# Patient Record
Sex: FEMALE | Race: BLACK OR AFRICAN AMERICAN | Employment: OTHER | URBAN - METROPOLITAN AREA
[De-identification: names, ages, dates, MRNs, and addresses within clinical notes are randomized per-mention and may not be internally consistent; named-entity substitution may affect disease eponyms.]

---

## 2017-07-05 ENCOUNTER — HOSPITAL ENCOUNTER (OUTPATIENT)
Dept: MAMMOGRAPHY | Age: 72
Discharge: HOME | End: 2017-07-05
Attending: PHYSICIAN ASSISTANT
Payer: MEDICARE

## 2017-07-05 DIAGNOSIS — M89.9 BONE DISORDER: ICD-10-CM

## 2017-07-05 PROCEDURE — 77080 DXA BONE DENSITY AXIAL: CPT

## 2017-09-26 ENCOUNTER — HOSPITAL ENCOUNTER (OUTPATIENT)
Dept: ULTRASOUND IMAGING | Age: 72
Discharge: HOME | End: 2017-09-26
Attending: PHYSICIAN ASSISTANT
Payer: MEDICARE

## 2017-09-26 ENCOUNTER — HOSPITAL ENCOUNTER (OUTPATIENT)
Dept: GENERAL RADIOLOGY | Age: 72
Discharge: HOME | End: 2017-09-26
Attending: PHYSICIAN ASSISTANT
Payer: MEDICARE

## 2017-09-26 DIAGNOSIS — R10.9 LEFT FLANK PAIN: ICD-10-CM

## 2017-09-26 PROCEDURE — 74000 XR ABD (KUB): CPT

## 2017-09-26 PROCEDURE — 76770 US EXAM ABDO BACK WALL COMP: CPT

## 2018-07-11 ENCOUNTER — TELEPHONE (OUTPATIENT)
Dept: NUTRITION | Age: 73
End: 2018-07-11

## 2018-07-11 NOTE — TELEPHONE ENCOUNTER
Nutrition Counseling: Called pt regarding Joseph Noah referral for nutrition counseling. Pt is interested, and would like insurance checked. RD will contact pt when insurance check is complete to provide coverage details and schedule appt.      Brooke Mckeon, [address]  Outpatient Dietitian  Office: [phone]  Cell: [phone]

## 2018-07-13 ENCOUNTER — TELEPHONE (OUTPATIENT)
Dept: NUTRITION | Age: 73
End: 2018-07-13

## 2018-07-13 NOTE — TELEPHONE ENCOUNTER
Nutrition Counseling: Called pt with information regarding Dr. Hubbard Reasons referral for nutrition counseling. Insurance will not cover appointment. Patient unsure if able to pay for that right now. States she will call us back.      Lavon Novoa RD, LD  W: [phone]

## 2018-08-01 ENCOUNTER — TELEPHONE (OUTPATIENT)
Dept: NUTRITION | Age: 73
End: 2018-08-01

## 2018-08-01 NOTE — TELEPHONE ENCOUNTER
Nutrition Counseling: Left VM with pt to follow up on scheduling a self pay appt, as requested. Explained self-pay rates. Left RD contact information if pt wishes to schedule an appt. If pt does not return call/contact RD within 2 weeks, will close referral for this office and notify referring physician.     Avery Mota, [address]  Outpatient Dietitian  Office: [phone]  Cell: [phone]

## 2018-08-28 ENCOUNTER — DOCUMENTATION ONLY (OUTPATIENT)
Dept: NUTRITION | Age: 73
End: 2018-08-28

## 2018-08-28 NOTE — PROGRESS NOTES
Nutrition Counseling:  No further contact with patient. Referral closed.   Julissa Nichole, [address]

## 2021-02-15 ENCOUNTER — APPOINTMENT (OUTPATIENT)
Dept: PHYSICAL THERAPY | Age: 76
End: 2021-02-15

## 2021-03-01 ENCOUNTER — HOSPITAL ENCOUNTER (OUTPATIENT)
Dept: PHYSICAL THERAPY | Age: 76
Discharge: HOME | End: 2021-03-01
Payer: MEDICARE

## 2021-03-01 ENCOUNTER — HOME HEALTH ADMISSION (OUTPATIENT)
Dept: HOME HEALTH SERVICES | Facility: HOME HEALTH | Age: 76
End: 2021-03-01
Payer: MEDICARE

## 2021-03-01 ENCOUNTER — HOSPITAL ENCOUNTER (OUTPATIENT)
Dept: SURGERY | Age: 76
Discharge: HOME | End: 2021-03-01
Payer: MEDICARE

## 2021-03-01 VITALS
RESPIRATION RATE: 16 BRPM | BODY MASS INDEX: 20.04 KG/M2 | TEMPERATURE: 97.6 F | HEIGHT: 70 IN | HEART RATE: 58 BPM | SYSTOLIC BLOOD PRESSURE: 125 MMHG | OXYGEN SATURATION: 100 % | DIASTOLIC BLOOD PRESSURE: 75 MMHG | WEIGHT: 140 LBS

## 2021-03-01 LAB
ANION GAP SERPL CALC-SCNC: 5 MMOL/L (ref 7–16)
APPEARANCE UR: CLEAR
APTT PPP: 32.7 SEC (ref 24.1–35.1)
ATRIAL RATE: 61 BPM
BACTERIA SPEC CULT: NORMAL
BASOPHILS # BLD: 0 K/UL (ref 0–0.2)
BASOPHILS NFR BLD: 1 % (ref 0–2)
BILIRUB UR QL: NEGATIVE
BUN SERPL-MCNC: 15 MG/DL (ref 8–23)
CALCIUM SERPL-MCNC: 9 MG/DL (ref 8.3–10.4)
CALCULATED P AXIS, ECG09: 73 DEGREES
CALCULATED R AXIS, ECG10: -30 DEGREES
CALCULATED T AXIS, ECG11: 52 DEGREES
CHLORIDE SERPL-SCNC: 104 MMOL/L (ref 98–107)
CO2 SERPL-SCNC: 29 MMOL/L (ref 21–32)
COLOR UR: YELLOW
CREAT SERPL-MCNC: 0.69 MG/DL (ref 0.6–1)
DIAGNOSIS, 93000: NORMAL
DIFFERENTIAL METHOD BLD: ABNORMAL
EOSINOPHIL # BLD: 0.1 K/UL (ref 0–0.8)
EOSINOPHIL NFR BLD: 1 % (ref 0.5–7.8)
ERYTHROCYTE [DISTWIDTH] IN BLOOD BY AUTOMATED COUNT: 15 % (ref 11.9–14.6)
EST. AVERAGE GLUCOSE BLD GHB EST-MCNC: 114 MG/DL
GLUCOSE SERPL-MCNC: 57 MG/DL (ref 65–100)
GLUCOSE UR STRIP.AUTO-MCNC: NEGATIVE MG/DL
HBA1C MFR BLD: 5.6 % (ref 4.2–6.3)
HCT VFR BLD AUTO: 36.2 % (ref 35.8–46.3)
HGB BLD-MCNC: 11.3 G/DL (ref 11.7–15.4)
HGB UR QL STRIP: NEGATIVE
IMM GRANULOCYTES # BLD AUTO: 0 K/UL (ref 0–0.5)
IMM GRANULOCYTES NFR BLD AUTO: 0 % (ref 0–5)
INR PPP: 1.2
KETONES UR QL STRIP.AUTO: NEGATIVE MG/DL
LEUKOCYTE ESTERASE UR QL STRIP.AUTO: NEGATIVE
LYMPHOCYTES # BLD: 1.1 K/UL (ref 0.5–4.6)
LYMPHOCYTES NFR BLD: 19 % (ref 13–44)
MCH RBC QN AUTO: 28.7 PG (ref 26.1–32.9)
MCHC RBC AUTO-ENTMCNC: 31.2 G/DL (ref 31.4–35)
MCV RBC AUTO: 91.9 FL (ref 79.6–97.8)
MONOCYTES # BLD: 0.6 K/UL (ref 0.1–1.3)
MONOCYTES NFR BLD: 10 % (ref 4–12)
NEUTS SEG # BLD: 4.1 K/UL (ref 1.7–8.2)
NEUTS SEG NFR BLD: 70 % (ref 43–78)
NITRITE UR QL STRIP.AUTO: NEGATIVE
NRBC # BLD: 0 K/UL (ref 0–0.2)
P-R INTERVAL, ECG05: 222 MS
PH UR STRIP: 6 [PH] (ref 5–9)
PLATELET # BLD AUTO: 191 K/UL (ref 150–450)
PMV BLD AUTO: 10.3 FL (ref 9.4–12.3)
POTASSIUM SERPL-SCNC: 3.9 MMOL/L (ref 3.5–5.1)
PROT UR STRIP-MCNC: NEGATIVE MG/DL
PROTHROMBIN TIME: 15.6 SEC (ref 12.5–14.7)
Q-T INTERVAL, ECG07: 426 MS
QRS DURATION, ECG06: 92 MS
QTC CALCULATION (BEZET), ECG08: 428 MS
RBC # BLD AUTO: 3.94 M/UL (ref 4.05–5.2)
SERVICE CMNT-IMP: NORMAL
SODIUM SERPL-SCNC: 138 MMOL/L (ref 136–145)
SP GR UR REFRACTOMETRY: 1.01 (ref 1–1.02)
UROBILINOGEN UR QL STRIP.AUTO: 0.2 EU/DL (ref 0.2–1)
VENTRICULAR RATE, ECG03: 61 BPM
WBC # BLD AUTO: 5.9 K/UL (ref 4.3–11.1)

## 2021-03-01 PROCEDURE — 93005 ELECTROCARDIOGRAM TRACING: CPT

## 2021-03-01 PROCEDURE — 85025 COMPLETE CBC W/AUTO DIFF WBC: CPT

## 2021-03-01 PROCEDURE — 81003 URINALYSIS AUTO W/O SCOPE: CPT

## 2021-03-01 PROCEDURE — 85730 THROMBOPLASTIN TIME PARTIAL: CPT

## 2021-03-01 PROCEDURE — 87641 MR-STAPH DNA AMP PROBE: CPT

## 2021-03-01 PROCEDURE — 80048 BASIC METABOLIC PNL TOTAL CA: CPT

## 2021-03-01 PROCEDURE — 83036 HEMOGLOBIN GLYCOSYLATED A1C: CPT

## 2021-03-01 PROCEDURE — 85610 PROTHROMBIN TIME: CPT

## 2021-03-01 PROCEDURE — 97161 PT EVAL LOW COMPLEX 20 MIN: CPT

## 2021-03-01 PROCEDURE — 77030027138 HC INCENT SPIROMETER -A

## 2021-03-01 RX ORDER — TURMERIC 400 MG
1 CAPSULE ORAL DAILY
COMMUNITY
End: 2021-03-11

## 2021-03-01 RX ORDER — LANOLIN ALCOHOL/MO/W.PET/CERES
325 CREAM (GRAM) TOPICAL 2 TIMES DAILY
COMMUNITY

## 2021-03-01 RX ORDER — OMEPRAZOLE 40 MG/1
40 CAPSULE, DELAYED RELEASE ORAL 2 TIMES DAILY
COMMUNITY

## 2021-03-01 RX ORDER — FLAXSEED OIL 1000 MG
1 CAPSULE ORAL
COMMUNITY
End: 2021-03-11

## 2021-03-01 RX ORDER — LACTOSE-REDUCED FOOD
1 LIQUID (ML) ORAL 3 TIMES DAILY
COMMUNITY

## 2021-03-01 RX ORDER — FUROSEMIDE 20 MG/1
20 TABLET ORAL DAILY
COMMUNITY
End: 2021-03-01

## 2021-03-01 RX ORDER — BISMUTH SUBSALICYLATE 262 MG
1 TABLET,CHEWABLE ORAL DAILY
COMMUNITY
End: 2021-03-11

## 2021-03-01 RX ORDER — ATORVASTATIN CALCIUM 80 MG/1
80 TABLET, FILM COATED ORAL
COMMUNITY

## 2021-03-01 RX ORDER — HYDROCODONE BITARTRATE AND ACETAMINOPHEN 10; 325 MG/1; MG/1
1 TABLET ORAL
Status: ON HOLD | COMMUNITY
End: 2021-03-10 | Stop reason: SDUPTHER

## 2021-03-01 RX ORDER — TRAZODONE HYDROCHLORIDE 50 MG/1
50 TABLET ORAL
COMMUNITY

## 2021-03-01 RX ORDER — GABAPENTIN 100 MG/1
100 CAPSULE ORAL 2 TIMES DAILY
COMMUNITY

## 2021-03-01 RX ORDER — MIRTAZAPINE 30 MG/1
30 TABLET, FILM COATED ORAL
COMMUNITY

## 2021-03-01 RX ORDER — FERROUS SULFATE, DRIED 160(50) MG
1 TABLET, EXTENDED RELEASE ORAL 2 TIMES DAILY WITH MEALS
COMMUNITY

## 2021-03-01 NOTE — ADVANCED PRACTICE NURSE
Total Joint Surgery Preoperative Chart Review      Patient ID:  Nithya Jiménez  [id number]  68 y.o.  1945  Surgeon: Dr. Gagan Huston  Date of Surgery: 3/9/2021  Procedure: Total Left Hip Arthroplasty  Primary Care Physician: Tiffanie Dior, [place] [phone]  Specialty Physician(s):      Subjective:   Nithya Jiménez is a 68 y.o. BLACK female who presents for preoperative evaluation for Total Left Hip arthroplasty. This is a preoperative chart review note based on data collected by the nurse at the surgical Pre-Assessment visit. Past Medical History:   Diagnosis Date    Anxiety     med    Arthritis     rheumatoid     Atrial fibrillation (HCC)     Eliquis BID, Followed by PCP     CAD (coronary artery disease)     mild on cath 2013 (done for +stress with anteroapical ischemia)     Cancer (HCC)     uterine    Constipation     med    Fibromyalgia     GERD (gastroesophageal reflux disease)     med    Hypercholesteremia     med    Hypertension     med    Iron deficiency anemia     PAD (peripheral artery disease) (HCC)     left leg: pt reports instructed to wear compression hose    Polyneuropathy     Rheumatoid arthritis (HCC)     Thyroid disease     hypo- med    Uterine cancer ([address]     hysterectomy     Vulva neoplasm     squamous cell, resected 2016       Past Surgical History:   Procedure Laterality Date    HX CATARACT REMOVAL Left     HX HEART CATHETERIZATION      HX HIP REPLACEMENT Right     HX KNEE REPLACEMENT Bilateral     HX LAP CHOLECYSTECTOMY      HX OTHER SURGICAL      EXCISION OF ANAL TAGS AND FULGURATION     HX OTHER SURGICAL      VULVA SURGERY FOR CANCER     HX PARATHYROIDECTOMY      HX TOTAL ABDOMINAL HYSTERECTOMY       History reviewed. No pertinent family history.    Social History     Tobacco Use    Smoking status: Current Every Day Smoker     Packs/day: 0.10     Years: 30.00     Pack years: 3.00    Smokeless tobacco: Never Used   Substance Use Topics    Alcohol use: Not Currently     Comment: occas       Prior to Admission medications    Medication Sig Start Date End Date Taking? Authorizing Provider   apixaban (Eliquis) 5 mg tablet Take 5 mg by mouth two (2) times a day. Yes Provider, Historical   atorvastatin (Lipitor) 80 mg tablet Take 80 mg by mouth nightly. Yes Provider, Historical   calcium-vitamin D (OS-YVONNE +D3) 500 mg-200 unit per tablet Take 1 Tab by mouth two (2) times daily (with meals). Yes Provider, Historical   fish oil-omega-3 fatty acids (Fish Oil) 340-1,000 mg capsule Take 1 Cap by mouth every other day. Yes Provider, Historical   food supplemt, lactose-reduced (Ensure Original) liqd Take 1 Bottle by mouth three (3) times daily. Ensue Original   Yes Provider, Historical   ferrous sulfate (Iron) 325 mg (65 mg iron) tablet Take 325 mg by mouth two (2) times a day. Yes Provider, Historical   flaxseed oil 1,000 mg cap Take 1 Tab by mouth daily as needed. Yes Provider, Historical   gabapentin (NEURONTIN) 100 mg capsule Take 100 mg by mouth two (2) times a day. Yes Provider, Historical   HYDROcodone-acetaminophen (NORCO)  mg tablet Take 1 Tab by mouth every six (6) hours as needed for Pain. Yes Provider, Historical   mirtazapine (Remeron) 30 mg tablet Take 30 mg by mouth nightly. Yes Provider, Historical   omeprazole (PRILOSEC) 40 mg capsule Take 40 mg by mouth two (2) times a day. Yes Provider, Historical   traZODone (DESYREL) 50 mg tablet Take 50 mg by mouth nightly as needed for Sleep. Yes Provider, Historical   turmeric 400 mg cap Take 1 Tab by mouth daily. Yes Provider, Historical   multivitamin (ONE A DAY) tablet Take 1 Tab by mouth daily. Yes Provider, Historical   aspirin delayed-release 81 mg tablet Take 81 mg by mouth every morning. Yes Provider, Historical   glucosamine sulfate 500 mg capsule Take  by mouth daily.    Yes Provider, Historical   naproxen (NAPROSYN) 500 mg tablet Take 500 mg by mouth two (2) times daily as needed. Yes Provider, Historical   predniSONE (DELTASONE) 5 mg tablet Take 5 mg by mouth daily as needed. Yes Provider, Historical   levothyroxine (SYNTHROID) 125 mcg tablet Take 137 mcg by mouth Daily (before breakfast). Take morning of surgery per anesthesia guidelines. Indications: a condition with low thyroid hormone levels   Yes Provider, Historical   amLODIPine (NORVASC) 10 mg tablet Take 10 mg by mouth daily. Take morning of surgery per anesthesia guidelines. Indications: HYPERTENSION   Yes Provider, Historical   docusate sodium (STOOL SOFTENER) 100 mg capsule Take 100 mg by mouth nightly as needed. Yes Provider, Historical   hydroxychloroquine (PLAQUENIL) 200 mg tablet Take 200 mg by mouth daily as needed. Indications: rheumatoid arthritis   Yes Other, MD Breanne     Allergies   Allergen Reactions    Lisinopril Swelling          Objective:     Physical Exam:   Patient Vitals for the past 24 hrs:   Temp Pulse Resp BP SpO2   03/01/21 1049 97.6 °F (36.4 °C) (!) 58 16 125/75 100 %       ECG:    EKG Results     Procedure 720 Value Units Date/Time    EKG, 12 LEAD, INITIAL [[id number]]     Order Status: Sent           Data Review:   Labs:   No results found for this or any previous visit (from the past 24 hour(s)). Labs pending    Problem List:  )  Patient Active Problem List   Diagnosis Code    Osteoarthritis of left knee M17.12    Total knee replacement status Z96.659    Osteoarthritis M19.90    S/P total hip arthroplasty Z96.649       Total Joint Surgery Pre-Assessment Recommendations:           Smoker  Albuterol every 6 hours as need during hospitalization. Recommend continuous saturation monitoring hours of sleep, during hospitalization. O2 prn per respiratory protocol. Patient with pitting edema and being seen today by cardiology. She has been  Nonambulatory due to pain in hip. Discussed starting her pre-surgery exercises for better circulation.      Signed By: CLEMENT Morales March 1, 2021

## 2021-03-01 NOTE — PERIOP NOTES
Lab results sent to PCP per surgeon's request.       Recent Results (from the past 12 hour(s))   EKG, 12 LEAD, INITIAL    Collection Time: 03/01/21 10:56 AM   Result Value Ref Range    Ventricular Rate 61 BPM    Atrial Rate 61 BPM    P-R Interval 222 ms    QRS Duration 92 ms    Q-T Interval 426 ms    QTC Calculation (Bezet) 428 ms    Calculated P Axis 73 degrees    Calculated R Axis -30 degrees    Calculated T Axis 52 degrees    Diagnosis       Sinus rhythm with sinus arrhythmia with 1st degree A-V block  Left axis deviation  Incomplete right bundle branch block  Minimal voltage criteria for LVH, may be normal variant  Abnormal ECG  No previous ECGs available     HEMOGLOBIN A1C WITH EAG    Collection Time: 03/01/21 11:29 AM   Result Value Ref Range    Hemoglobin A1c 5.6 4.20 - 6.30 %    Est. average glucose 394 mg/dL   METABOLIC PANEL, BASIC    Collection Time: 03/01/21 11:29 AM   Result Value Ref Range    Sodium 138 136 - 145 mmol/L    Potassium 3.9 3.5 - 5.1 mmol/L    Chloride 104 98 - 107 mmol/L    CO2 29 21 - 32 mmol/L    Anion gap 5 (L) 7 - 16 mmol/L    Glucose 57 (L) 65 - 100 mg/dL    BUN 15 8 - 23 MG/DL    Creatinine 0.69 0.6 - 1.0 MG/DL    GFR est AA >60 >60 ml/min/1.73m2    GFR est non-AA >60 >60 ml/min/1.73m2    Calcium 9.0 8.3 - 10.4 MG/DL   PROTHROMBIN TIME + INR    Collection Time: 03/01/21 11:29 AM   Result Value Ref Range    Prothrombin time 15.6 (H) 12.5 - 14.7 sec    INR 1.2     PTT    Collection Time: 03/01/21 11:29 AM   Result Value Ref Range    aPTT 32.7 24.1 - 35.1 SEC   CBC WITH AUTOMATED DIFF    Collection Time: 03/01/21 11:29 AM   Result Value Ref Range    WBC 5.9 4.3 - 11.1 K/uL    RBC 3.94 (L) 4.05 - 5.2 M/uL    HGB 11.3 (L) 11.7 - 15.4 g/dL    HCT 36.2 35.8 - 46.3 %    MCV 91.9 79.6 - 97.8 FL    MCH 28.7 26.1 - 32.9 PG    MCHC 31.2 (L) 31.4 - 35.0 g/dL    RDW 15.0 (H) 11.9 - 14.6 %    PLATELET 804 081 - 722 K/uL    MPV 10.3 9.4 - 12.3 FL    ABSOLUTE NRBC 0.00 0.0 - 0.2 K/uL    DF AUTOMATED NEUTROPHILS 70 43 - 78 %    LYMPHOCYTES 19 13 - 44 %    MONOCYTES 10 4.0 - 12.0 %    EOSINOPHILS 1 0.5 - 7.8 %    BASOPHILS 1 0.0 - 2.0 %    IMMATURE GRANULOCYTES 0 0.0 - 5.0 %    ABS. NEUTROPHILS 4.1 1.7 - 8.2 K/UL    ABS. LYMPHOCYTES 1.1 0.5 - 4.6 K/UL    ABS. MONOCYTES 0.6 0.1 - 1.3 K/UL    ABS. EOSINOPHILS 0.1 0.0 - 0.8 K/UL    ABS. BASOPHILS 0.0 0.0 - 0.2 K/UL    ABS. IMM.  GRANS. 0.0 0.0 - 0.5 K/UL   URINALYSIS W/ RFLX MICROSCOPIC    Collection Time: 03/01/21 11:32 AM   Result Value Ref Range    Color YELLOW      Appearance CLEAR      Specific gravity 1.015 1.001 - 1.023      pH (UA) 6.0 5.0 - 9.0      Protein Negative NEG mg/dL    Glucose Negative mg/dL    Ketone Negative NEG mg/dL    Bilirubin Negative NEG      Blood Negative NEG      Urobilinogen 0.2 0.2 - 1.0 EU/dL    Nitrites Negative NEG      Leukocyte Esterase Negative NEG

## 2021-03-01 NOTE — PROGRESS NOTES
[facility] Case Management note:  Patient screened in Prehab for discharge planning needs. Patient scheduled for a future total joint replacement. We discussed Home Health and equipment needed after surgery. List of Home Health providers offered. Patient w/o preference towards provider. Initial referral sent to [facility]. She has several walkers and a 3-1 BSC. May need to offer OT and aide to HHPT as she she is very debilitated due to RA and dtr struggles with helping her w/ showers.

## 2021-03-01 NOTE — PROGRESS NOTES
Shelley Sigala  : (6 y.o.) [facility] at [address] [place] [address], [address]  [phone]       Physical Therapy Prehab Plan of Treatment and Evaluation Summary:3/1/2021    ICD-10: Treatment Diagnosis:   · Pain in left hip (M25.552)  · Stiffness of Left Hip, Not elsewhere classified (M25.652)  · Difficulty in walking, Not elsewhere classified (R26.2)  · Other abnormalities of gait and mobility (R26.89)  Precautions/Allergies:   Lisinopril  MEDICAL/REFERRING DIAGNOSIS:  Unilateral primary osteoarthritis, left hip [M16.12]  REFERRING PHYSICIAN: Naomi Kumar MD  DATE OF SURGERY: 3/9/21    Assessment:   Comments:  Scheduled for L NATE. Has previously had R NATE  and L TKA . Her functional mobility is quite limited due to pain. She arrived to department via wheelchair. She was late due to getting lost which limited assessment time. She required min assist X 2 to stand and was able to take only a few steps with RW and min assist. She has limited ROM L hip, B knees and severely limited ROM and decreased function B hands due to RA. 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 She lives with her daughter. Daughter helps her with ADLs and transfers and mobility. She may benefit from 9th floor or rehab stay but prefers to go home. She has a rheumatologist but would certainly benefit from some intervention to increase hand function  - OT and hand surgery consult- after she recovers from [address]. Discussed with patient, daughter, and . If patient decides to go home, recommend she stay in the hospital an extra night or two before discharge home due to lower functional level. Also, BRI Momin [facility]        PROBLEM LIST (Impacting functional limitations):  Ms. Valentino Aranda presents with the following left lower extremity(s) problems:  1. Transfers  2. Gait  3. Strength  4. Range of Motion  5. Home Exercise Program  6. Precautions  7. Pain   INTERVENTIONS PLANNED:  1. Home Exercise Program  2. Educational Discussion      TREATMENT PLAN: Effective Dates: 3/1/2021 TO 3/1/2021. Frequency/Duration: Patient to continue to perform home exercise program at least twice per day up until her surgery. GOALS: (Goals have been discussed and agreed upon with patient.)  Discharge Goals: Time Frame: 1 Day  1. Patient will demonstrate independence with a home exercise program designed to increase functional technique and pain control to minimize functional deficits and optimize patient for total joint replacement. Rehabilitation Potential For Stated Goals: Fair  Regarding Taty Contreras's therapy, I certify that the treatment plan above will be carried out by a therapist or under their direction. Thank you for this referral,  Reyes Quan, PT               HISTORY:   Present Symptoms:  Pain Intensity 1: 10  Pain Location 1: Hip  Pain Orientation 1: Left   History of Present Injury/Illness (Reason for Referral):  Medical/Referring Diagnosis: Unilateral primary osteoarthritis, left hip [M16.12]   Past Medical History/Comorbidities:   Ms. Tabitha Queen  has a past medical history of Anxiety, Arthritis, Atrial fibrillation ([address] CAD (coronary artery disease), Cancer ([address] Constipation, Fibromyalgia, GERD (gastroesophageal reflux disease), Hypercholesteremia, Hypertension, Iron deficiency anemia, PAD (peripheral artery disease) ([address] Polyneuropathy, Rheumatoid arthritis ([address] Thyroid disease, Uterine cancer ([address] and Vulva neoplasm. She also has no past medical history of Autoimmune disease ([address] Other ill-defined conditions(799.89), or Unspecified adverse effect of anesthesia.   Ms. Tabitha Queen  has a past surgical history that includes hx lap cholecystectomy; hx total abdominal hysterectomy; hx knee replacement (Bilateral); hx hip replacement (Right); hx cataract removal (Left); hx heart catheterization; hx parathyroidectomy; hx other surgical; and hx other surgical.  Social History/Living Environment:   Home Environment: Private residence  # Steps to Enter: 3  Rails to Enter: Yes  Hand Rails : Bilateral  One/Two Story Residence: One story  Living Alone: No  Support Systems: Child(clemente)  Patient Expects to be Discharged to[de-identified] Unknown(home versus rehab)  Current DME Used/Available at Home: Cane, straight, Commode, bedside, Shower chair, Walker, rolling  Tub or Shower Type: Tub/Shower combination  Work/Activity:  disabled  Dominant Side:  RIGHT  Current Medications:  See Pre-assessment nursing note   Number of Personal Factors/Comorbidities that affect the Plan of Care: 3+: HIGH COMPLEXITY   EXAMINATION:   ADLs (Current Functional Status):   Ambulation:  [] Independent  [] Walk Indoors Only  [] Walk Outdoors  [x] Use Assistive Device  [] Use Wheelchair Only Dressing:  [] [address] from Someone for:  [] Sock/Shoes  [] Pants  [x] Everything   Bathing/Showering:   [] Independent  [] Requires Assistance from Someone  [x] [address]:  [] Routine house and yard work  [] Light Housework Only  [x] None   Observation/Orthostatic Postural Assessment:       ROM/Flexibility:   AROM: Generally decreased, functional(generally decreased to grossly decreased)                LLE AROM  L Hip Flexion: 90  L Hip ABduction: 10  L Knee Flexion: 90(AAROM)  L Knee Extension: 0      RLE AROM  R Knee Flexion: 90  R Knee Extension: 0   Strength:   Strength: Generally decreased, functional                  Functional Mobility:         Stand to Sit: Minimum assistance  Sit to Stand: Minimum assistance, Assist x2  Distance (ft): 2 Feet (ft)  Ambulation - Level of Assistance: Minimal assistance; Additional time;Assist x1  Assistive Device: Walker, rolling(pushed in wheelchair around department)  Speed/Shannon: Pace decreased (<100 feet/min)  Stance: Left decreased  Gait Abnormalities: Antalgic;Decreased step clearance;Shuffling gait          Balance:    Sitting: Intact  Standing: Pull to stand, With support   Body Structures Involved:  1. Bones  2. Joints  3. Muscles Body Functions Affected:  1. Neuromusculoskeletal  2. Movement Related Activities and Participation Affected:  1. General Tasks and Demands  2. Mobility   Number of elements that affect the Plan of Care: 3: MODERATE COMPLEXITY   CLINICAL PRESENTATION:   Presentation: Evolving clinical presentation with changing clinical characteristics: MODERATE COMPLEXITY   CLINICAL DECISION MAKING:   Outcome Measure: Tool Used: Lower Extremity Functional Scale (LEFS)  Score:  Initial: 6/80 Most Recent: X/80 (Date: -- )   Interpretation of Score: 20 questions each scored on a 5 point scale with 0 representing \"extreme difficulty or unable to perform\" and 4 representing \"no difficulty\". The lower the score, the greater the functional disability. 80/80 represents no disability. Minimal detectable change is 9 points. Medical Necessity:   · Ms. Fran Truong is expected to optimize her lower extremity strength and ROM in preparation for joint replacement surgery. Reason for Services/Other Comments:  · Achieve baseline assesment of musculoskeletal system, functional mobility and home environment. , educate in PT HEP in preparation for surgery, educate in hospital plan of care. Use of outcome tool(s) and clinical judgement create a POC that gives a: Clear prediction of patient's progress: LOW COMPLEXITY   TREATMENT:   Treatment/Session Assessment:  Patient was instructed in PT- HEP to increase strength and ROM in LEs. Answered all questions. · Post session pain:  10  · Compliance with Program/Exercises: anticipate limited compliance.   Total Treatment Duration:  PT Patient Time In/Time Out  Time In: 0945  Time Out: [address] Vlad[address]av Lewis PT

## 2021-03-01 NOTE — PROGRESS NOTES
03/01/21 0900   Oxygen Therapy   O2 Sat (%) 100 %   Pulse via Oximetry 65 beats per minute   Pre-Treatment   Breath Sounds Bilateral Clear   Pre FEV1 (liters) 1.8 liters   % Predicted 88     Initial respiratory Assessment completed with pt. Pt was interviewed and evaluated in Joint camp prior to surgery. Patient ID:  Simon Lo  [id number]  68 y.o.  1945  Surgeon: Dr. Red Meyer  Date of Surgery: The linked surgery was not found. Please check manually. Procedure: Total Left Hip Arthroplasty  Primary Care Physician: Cassia Park, [phone]  Specialists:     Pt. IS SOMEWHAT PRACTICING SOCIAL DISTANCING AND WEARING A MASK WHEN IN PUBLIC. Pt taught proper COUGH technique  DIAPHRAGMATIC BREATHING EXERCISE INSTRUCTIONS GIVEN    History of smoking:   CURRENT SMOKER-     1/2     PPD for       56     YEARS  Smoking Cessation  \"SMOKING: YOUR PLAN TO QUIT\" handout given to pt. Pt taught to identify when anxiety or stress  is a trigger . Relaxation breathing technique taught to pt.                  Quit date:         Secondhand smoke:DENIES    Past procedures with Oxygen desaturation or delayed awakening:DENIES    Past Medical History:   Diagnosis Date    Anxiety     med    Arthritis     rheumatoid     Atrial fibrillation (HCC)     Eliquis BID, Followed by PCP     CAD (coronary artery disease)     mild on cath 2013 (done for +stress with anteroapical ischemia)     Cancer (HCC)     uterine    Constipation     med    Fibromyalgia     GERD (gastroesophageal reflux disease)     med    Hypercholesteremia     med    Hypertension     med    Iron deficiency anemia     PAD (peripheral artery disease) ([address]     left leg: pt reports instructed to wear compression hose    Polyneuropathy     Rheumatoid arthritis ([address]     Thyroid disease     hypo- med    Uterine cancer ([address]     hysterectomy     Vulva neoplasm     squamous cell, resected 2016     HX OF PNA 2 YEARS AGO, PULMONARY NODULE Respiratory history:DENIES SOB                                                                     Respiratory meds:  DENIES    FAMILY PRESENT:             NO                                                   PAST SLEEP STUDY:                          DENIES  HX OF EVANGELINA:                                          DENIES  EVANGELINA assessment:     HS OF A-FIB                                          SLEEPS ON     BACK         PHYSICAL EXAM   Body mass index is 20.09 kg/m².    Visit Vitals  /75 (BP 1 Location: Right upper arm, BP Patient Position: At rest;Sitting)   Pulse (!) 58   Temp 97.6 °F (36.4 °C)   Resp 16   Ht 5' 10\" (1.778 m)   Wt 63.5 kg (140 lb)   SpO2 100%   BMI 20.09 kg/m²     Neck circumference:   35   cm    Loud snoring:                                                 YES          Witnessed apnea or wakening gasping or choking:        DENIES         Awakens with headaches:                                               DENIES  Morning or daytime tiredness/ sleepiness:                      DENIES            Dry mouth or sore throat in morning:                      DENIES                                   Blas stage:  3                                   SACS score:5  Stop Bang   STOP-BANG  Does the patient snore loudly (louder than talking or loud enough to be heard through closed doors)?: Yes  Does the patient often feel tired, fatigued, or sleepy during the daytime, even after a \"good\" night's sleep?: No  Has anyone ever observed the patient stop breathing during their sleep? : No  Does the patient have or are they being treated for high blood pressure?: Yes  Is the patient's BMI greater than 35?: No  Is your neck circumference greater than 17 inches (Male) or 16 inches (Female)?: No  Is the patient older than 48?: Yes  Is the patient male?: No  EVANGELINA Score: 3  Has the patient been referred to Sleep Medicine?: No  Has the patient previously been diagnosed with Obstructive Sleep Apnea?: No CS HS                                        Referrals:    Pt. Phone Number:

## 2021-03-01 NOTE — PERIOP NOTES
PLEASE CONTINUE TAKING ALL PRESCRIPTION MEDICATIONS UP TO THE DAY OF SURGERY UNLESS OTHERWISE DIRECTED BELOW. DISCONTINUE all vitamins and supplements 21 days prior to surgery. DISCONTINUE Non-Steriodal Anti-Inflammatory (NSAIDS) such as Advil and Aleve 5 days prior to surgery. Home Medications to take  the day of surgery   Amlodipine, aspirin, gabapentin, levothyroxine, hydroxchloroquine if needed, prednisone if needed, omeprazole, hydrocodone if needed            Home Medications   to Hold   Stop vitamins and supplements now    Stop Eliquis 3 days prior to surgery, start aspirin 81 mg daily while off of Eliquis    Stop naproxen five days prior to surgery        Comments    Covid test 3/2/21 at 11:30 AM @ 2 2 [facility],[address], [place]    Bring Incentive spirometer and Romi-hex wash to the hospital on the day of surgery. *Visitor policy of 1 visitor per patient discussed. Please do not bring home medications with you on the day of surgery unless otherwise directed by your nurse. If you are instructed to bring home medications, please give them to your nurse as they will be administered by the nursing staff. If you have any questions, please call [facility] [phone]. A copy of this note was provided to the patient for reference.

## 2021-03-01 NOTE — PERIOP NOTES
Patient verified name and . Order for consent found in EHR and matches case posting; patient verified. left total hip arthroplasty    Type 3 surgery, PAT Joint assessment complete. Labs per surgeon: CBC,BMP, PT/PTT, HgbA1C; results- PT elevated; will have anesthesia review. All other lab results within anesthesia guidelines. Labs per anesthesia protocol: no additional lab work needed. EKG: Done today- within anesthesia guidelines. Pt has a cardiology office visit today. Will have charge nurse follow up after appointment. Alexander High NP notified of +2 edema in BLE; no new orders received. Left voicemail with Dr. Minoo Capps office requesting a 3 day hold of Eliquis. Last Echo dated 19 found in care everywhere if needed for anesthesia reference. Patient COVID test date 3/2/21; Patient aware. The testing center [address] provided to the patient. MRSA/MSSA swab collected; pharmacy to review and dose antibiotic as appropriate. Hospital approved surgical skin cleanser and instructions to return bottle on DOS given per hospital policy. Patient provided with handouts including Guide to Surgery, Pain Management, Hand Hygiene, Blood Transfusion Education, and [facility] Brochure. Patient answered medical/surgical history questions at their best of ability. All prior to admission medications documented in [facility]. Original medication prescription bottle visualized during patient appointment. Patient instructed to hold all vitamins 3 weeks prior to surgery and NSAIDS 5 days prior to surgery. Patient teach back successful and patient demonstrates knowledge of instruction.

## 2021-03-02 NOTE — PERIOP NOTES
Pt had cardiology eval 3/1/21- cleared for scheduled surgery 3/9/21. Per office note:    Preoperative evaluation: The patient has a revised cardiac risk index score of 0. Her functional capacity is limited by her significant hip pain. Overall she is at low risk for major adverse cardiac event with noncardiac surgery. No further cardiac evaluation required prior to surgery. No medication changes prior to her surgery. She can safely hold her apixaban prior to her hip surgery as deemed necessary by the surgeon. Paroxysmal atrial fibrillation: The patient denies any symptoms of atrial fibrillation. Her EKG today revealed sinus rhythm with 1 PAC. The patient has a [id number] score of 4. We will continue apixaban 5 mg twice daily. CAD: The patient had nonobstructive coronary disease on previous left heart catheterization. She denies any symptoms of angina. Continue atorvastatin. Lower extremity edema: The patient has bilateral lower extremity edema. She recently had a BNP which was in the normal range. This is likely dependent edema/venous insufficiency. No further cardiac evaluation required. Return in about 1 year (around 3/1/2022).     Charito Marin MD   Electronically signed by Celia Anand MD at 03/01/2021 2:47 PM EST

## 2021-03-05 NOTE — H&P
H&P    Patient ID:  Montse Salas  [id number]  43 y.o.  1945  Surgeon:  Mounika Rodriguez MD  Date of Surgery: * No surgery date entered *  Procedure: Left Hip Total Arthroplasty  Primary Care Physician: Edu Booth NP        Subjective:  Montse Salas is a 68 y.o. BLACK female who presents with Left Hip pain. He has history of Left Hip pain for several months. Symptoms worse with walking long distances and relieved with rest. Conservative treatment consisting of  activity modification has not helped. The patient lives with their family. The patients goal after surgery is improved pain and function. Past Medical History:   Diagnosis Date    Anxiety     med    Arthritis     rheumatoid     Atrial fibrillation (HCC)     Eliquis BID, Followed by PCP     CAD (coronary artery disease)     mild on cath 2013 (done for +stress with anteroapical ischemia)     Cancer (HCC)     uterine    Constipation     med    Fibromyalgia     GERD (gastroesophageal reflux disease)     med    Hypercholesteremia     med    Hypertension     med    Iron deficiency anemia     PAD (peripheral artery disease) (HCC)     left leg: pt reports instructed to wear compression hose    Polyneuropathy     Rheumatoid arthritis (HCC)     Thyroid disease     hypo- med    Uterine cancer ([address]     hysterectomy     Vulva neoplasm     squamous cell, resected 2016       Past Surgical History:   Procedure Laterality Date    HX CATARACT REMOVAL Left     HX HEART CATHETERIZATION      HX HIP REPLACEMENT Right     HX KNEE REPLACEMENT Bilateral     HX LAP CHOLECYSTECTOMY      HX OTHER SURGICAL      EXCISION OF ANAL TAGS AND FULGURATION     HX OTHER SURGICAL      VULVA SURGERY FOR CANCER     HX PARATHYROIDECTOMY      HX TOTAL ABDOMINAL HYSTERECTOMY       No family history on file.    Social History     Tobacco Use    Smoking status: Current Every Day Smoker     Packs/day: 0.10     Years: 30.00     Pack years: 3.00    Smokeless tobacco: Never Used   Substance Use Topics    Alcohol use: Not Currently     Comment: occas       Prior to Admission medications    Medication Sig Start Date End Date Taking? Authorizing Provider   apixaban (Eliquis) 5 mg tablet Take 5 mg by mouth two (2) times a day. Provider, Historical   atorvastatin (Lipitor) 80 mg tablet Take 80 mg by mouth nightly. Provider, Historical   calcium-vitamin D (OS-YVONNE +D3) 500 mg-200 unit per tablet Take 1 Tab by mouth two (2) times daily (with meals). Provider, Historical   fish oil-omega-3 fatty acids (Fish Oil) 340-1,000 mg capsule Take 1 Cap by mouth every other day. Provider, Historical   food supplemt, lactose-reduced (Ensure Original) liqd Take 1 Bottle by mouth three (3) times daily. Ensue Original    Provider, Historical   ferrous sulfate (Iron) 325 mg (65 mg iron) tablet Take 325 mg by mouth two (2) times a day. Provider, Historical   flaxseed oil 1,000 mg cap Take 1 Tab by mouth daily as needed. Provider, Historical   gabapentin (NEURONTIN) 100 mg capsule Take 100 mg by mouth two (2) times a day. Provider, Historical   HYDROcodone-acetaminophen (NORCO)  mg tablet Take 1 Tab by mouth every six (6) hours as needed for Pain. Provider, Historical   mirtazapine (Remeron) 30 mg tablet Take 30 mg by mouth nightly. Provider, Historical   omeprazole (PRILOSEC) 40 mg capsule Take 40 mg by mouth two (2) times a day. Provider, Historical   traZODone (DESYREL) 50 mg tablet Take 50 mg by mouth nightly as needed for Sleep. Provider, Historical   turmeric 400 mg cap Take 1 Tab by mouth daily. Provider, Historical   multivitamin (ONE A DAY) tablet Take 1 Tab by mouth daily. Provider, Historical   aspirin delayed-release 81 mg tablet Take 81 mg by mouth every morning. Provider, Historical   glucosamine sulfate 500 mg capsule Take  by mouth daily.     Provider, Historical   naproxen (NAPROSYN) 500 mg tablet Take 500 mg by mouth two (2) times daily as needed. Provider, Historical   predniSONE (DELTASONE) 5 mg tablet Take 5 mg by mouth daily as needed. Provider, Historical   levothyroxine (SYNTHROID) 125 mcg tablet Take 137 mcg by mouth Daily (before breakfast). Take morning of surgery per anesthesia guidelines. Indications: a condition with low thyroid hormone levels    Provider, Historical   amLODIPine (NORVASC) 10 mg tablet Take 10 mg by mouth daily. Take morning of surgery per anesthesia guidelines. Indications: HYPERTENSION    Provider, Historical   docusate sodium (STOOL SOFTENER) 100 mg capsule Take 100 mg by mouth nightly as needed. Provider, Historical   hydroxychloroquine (PLAQUENIL) 200 mg tablet Take 200 mg by mouth daily as needed. Indications: rheumatoid arthritis    Other, MD Breanne     Allergies   Allergen Reactions    Lisinopril Swelling        REVIEW OF SYSTEMS:  CONSTITUTIONAL: Denies fever, decreased appetite, weight loss/gain, night sweats or fatigue. HEENT: Denies vision or hearing changes. denies glasses. denies hearing aids. CARDIAC: Denies CP, palpitations, rheumatic fever, murmur, peripheral edema, carotid artery disease or syncopal episodes. RESPIRATORY: Denies dyspnea on exertion, asthma, COPD or orthopnea. GI: Denies GERD, history of GI bleed or melena, PUD, hepatitis or cirrhosis. : Denies dysuria, hematuria. denies BPH symptoms. HEMATOLOGIC: Denies anemia or blood disorders. ENDOCRINE: Denies thyroid disease. MUSCULOSKELETAL: See HPI. NEUROLOGIC: Denies seizure, peripheral neuropathy or memory loss. PSYCH: Denies depression, anxiety or insomnia. SKIN: Denies rash or open sores. Objective:    PHYSICAL EXAM  GENERAL: No data found. EYES: PERRL. EOM intact. MOUTH:Teeth and Gums normal. NECK: Full ROM. Trachea midline. No thyromegaly or JVD. CARDIOVASCULAR: Regular rate and rhythm. No murmur or gallops. No carotid bruits. Peripheral pulses: radial 2 +, PT 2+, DP 2+ bilaterally.  LUNGS: CTA bilaterally. No wheezes, rhonchi or rales. GI: positive BS. Abdomen nontender. NEUROLOGIC: Alert and oriented x 3. Bilateral equal strong had grasp and bilateral equal strong plantar flexion and dorsiflexion. GAIT: abnormal MUSCULOSKELETAL: ROM: full without pain. Tenderness: . Crepitus: not present. SKIN: No rash, bruising, swelling, redness or warmth. Labs:  No results found for this or any previous visit (from the past 24 hour(s)). Xray Left Hip: joint space narrowing    Assessment:  Advanced Left Hip Osteoarthritis. Total Left Hip Arthroplasty Indicated. Patient Active Problem List   Diagnosis Code    Osteoarthritis of left knee M17.12    Total knee replacement status Z96.659    Osteoarthritis M19.90    S/P total hip arthroplasty Z96.649       Plan:  I have advised the patient of the risks and consequences, including possible complications of performing total joint replacement, as well as not doing this operation. The patient had the opportunity to ask questions and have them answered to their satisfaction.      Signed:  HENOK Wilde 3/5/2021

## 2021-03-08 ENCOUNTER — ANESTHESIA EVENT (OUTPATIENT)
Dept: SURGERY | Age: 76
DRG: 470 | End: 2021-03-08
Payer: MEDICARE

## 2021-03-09 ENCOUNTER — ANESTHESIA (OUTPATIENT)
Dept: SURGERY | Age: 76
DRG: 470 | End: 2021-03-09
Payer: MEDICARE

## 2021-03-09 ENCOUNTER — HOSPITAL ENCOUNTER (INPATIENT)
Age: 76
LOS: 2 days | Discharge: HOME HEALTH | DRG: 470 | End: 2021-03-11
Attending: ORTHOPAEDIC SURGERY | Admitting: ORTHOPAEDIC SURGERY
Payer: MEDICARE

## 2021-03-09 ENCOUNTER — APPOINTMENT (OUTPATIENT)
Dept: GENERAL RADIOLOGY | Age: 76
DRG: 470 | End: 2021-03-09
Attending: PHYSICIAN ASSISTANT
Payer: MEDICARE

## 2021-03-09 DIAGNOSIS — Z96.642 HISTORY OF TOTAL HIP ARTHROPLASTY, LEFT: Primary | ICD-10-CM

## 2021-03-09 PROBLEM — M16.12 ARTHRITIS OF LEFT HIP: Status: ACTIVE | Noted: 2021-03-09

## 2021-03-09 LAB
GLUCOSE BLD STRIP.AUTO-MCNC: 80 MG/DL (ref 65–100)
HGB BLD-MCNC: 8.9 G/DL (ref 11.7–15.4)
INR BLD: 1 (ref 0.9–1.2)
PT BLD: 12.6 SECS (ref 9.6–11.6)

## 2021-03-09 PROCEDURE — 74011000258 HC RX REV CODE- 258: Performed by: ORTHOPAEDIC SURGERY

## 2021-03-09 PROCEDURE — 74011250636 HC RX REV CODE- 250/636: Performed by: ORTHOPAEDIC SURGERY

## 2021-03-09 PROCEDURE — 74011250636 HC RX REV CODE- 250/636: Performed by: PHYSICIAN ASSISTANT

## 2021-03-09 PROCEDURE — 77030012935 HC DRSG AQUACEL BMS -B: Performed by: ORTHOPAEDIC SURGERY

## 2021-03-09 PROCEDURE — 77030003665 HC NDL SPN BBMI -A: Performed by: STUDENT IN AN ORGANIZED HEALTH CARE EDUCATION/TRAINING PROGRAM

## 2021-03-09 PROCEDURE — 77030018673: Performed by: ORTHOPAEDIC SURGERY

## 2021-03-09 PROCEDURE — 65270000029 HC RM PRIVATE

## 2021-03-09 PROCEDURE — 0SRB04A REPLACEMENT OF LEFT HIP JOINT WITH CERAMIC ON POLYETHYLENE SYNTHETIC SUBSTITUTE, UNCEMENTED, OPEN APPROACH: ICD-10-PCS | Performed by: ORTHOPAEDIC SURGERY

## 2021-03-09 PROCEDURE — 74011000250 HC RX REV CODE- 250: Performed by: NURSE ANESTHETIST, CERTIFIED REGISTERED

## 2021-03-09 PROCEDURE — 77030035236 HC SUT PDS STRATFX BARB J&J -B: Performed by: ORTHOPAEDIC SURGERY

## 2021-03-09 PROCEDURE — 77030018547 HC SUT ETHBND1 J&J -B: Performed by: ORTHOPAEDIC SURGERY

## 2021-03-09 PROCEDURE — 77030003602 HC NDL NRV BLK BBMI -B: Performed by: STUDENT IN AN ORGANIZED HEALTH CARE EDUCATION/TRAINING PROGRAM

## 2021-03-09 PROCEDURE — 82962 GLUCOSE BLOOD TEST: CPT

## 2021-03-09 PROCEDURE — 76060000035 HC ANESTHESIA 2 TO 2.5 HR: Performed by: ORTHOPAEDIC SURGERY

## 2021-03-09 PROCEDURE — 74011000250 HC RX REV CODE- 250: Performed by: PHYSICIAN ASSISTANT

## 2021-03-09 PROCEDURE — 77030037715 HC DRSG ZIP STRY -B: Performed by: ORTHOPAEDIC SURGERY

## 2021-03-09 PROCEDURE — 77030040922 HC BLNKT HYPOTHRM STRY -A: Performed by: STUDENT IN AN ORGANIZED HEALTH CARE EDUCATION/TRAINING PROGRAM

## 2021-03-09 PROCEDURE — 77030031139 HC SUT VCRL2 J&J -A: Performed by: ORTHOPAEDIC SURGERY

## 2021-03-09 PROCEDURE — 76010000162 HC OR TIME 1.5 TO 2 HR INTENSV-TIER 1: Performed by: ORTHOPAEDIC SURGERY

## 2021-03-09 PROCEDURE — 85610 PROTHROMBIN TIME: CPT

## 2021-03-09 PROCEDURE — 77030033067 HC SUT PDO STRATFX SPIR J&J -B: Performed by: ORTHOPAEDIC SURGERY

## 2021-03-09 PROCEDURE — 72170 X-RAY EXAM OF PELVIS: CPT

## 2021-03-09 PROCEDURE — 74011000250 HC RX REV CODE- 250: Performed by: ORTHOPAEDIC SURGERY

## 2021-03-09 PROCEDURE — 74011250636 HC RX REV CODE- 250/636: Performed by: STUDENT IN AN ORGANIZED HEALTH CARE EDUCATION/TRAINING PROGRAM

## 2021-03-09 PROCEDURE — 85018 HEMOGLOBIN: CPT

## 2021-03-09 PROCEDURE — 36415 COLL VENOUS BLD VENIPUNCTURE: CPT

## 2021-03-09 PROCEDURE — 74011250637 HC RX REV CODE- 250/637: Performed by: STUDENT IN AN ORGANIZED HEALTH CARE EDUCATION/TRAINING PROGRAM

## 2021-03-09 PROCEDURE — 77030037713 HC CLOSR DEV INCIS ZIP STRY -B: Performed by: ORTHOPAEDIC SURGERY

## 2021-03-09 PROCEDURE — 76210000006 HC OR PH I REC 0.5 TO 1 HR: Performed by: ORTHOPAEDIC SURGERY

## 2021-03-09 PROCEDURE — 77030006835 HC BLD SAW SAG STRY -B: Performed by: ORTHOPAEDIC SURGERY

## 2021-03-09 PROCEDURE — 77030007880 HC KT SPN EPDRL BBMI -B: Performed by: STUDENT IN AN ORGANIZED HEALTH CARE EDUCATION/TRAINING PROGRAM

## 2021-03-09 PROCEDURE — 77030018074 HC RTVR SUT ARTH4 S&N -B: Performed by: ORTHOPAEDIC SURGERY

## 2021-03-09 PROCEDURE — 74011250636 HC RX REV CODE- 250/636: Performed by: NURSE ANESTHETIST, CERTIFIED REGISTERED

## 2021-03-09 PROCEDURE — 94762 N-INVAS EAR/PLS OXIMTRY CONT: CPT

## 2021-03-09 PROCEDURE — 2709999900 HC NON-CHARGEABLE SUPPLY: Performed by: ORTHOPAEDIC SURGERY

## 2021-03-09 PROCEDURE — 74011250637 HC RX REV CODE- 250/637: Performed by: PHYSICIAN ASSISTANT

## 2021-03-09 PROCEDURE — 74011000250 HC RX REV CODE- 250: Performed by: STUDENT IN AN ORGANIZED HEALTH CARE EDUCATION/TRAINING PROGRAM

## 2021-03-09 PROCEDURE — 77030002966 HC SUT PDS J&J -A: Performed by: ORTHOPAEDIC SURGERY

## 2021-03-09 PROCEDURE — C1776 JOINT DEVICE (IMPLANTABLE): HCPCS | Performed by: ORTHOPAEDIC SURGERY

## 2021-03-09 PROCEDURE — 77030019557 HC ELECTRD VES SEAL MEDT -F: Performed by: ORTHOPAEDIC SURGERY

## 2021-03-09 PROCEDURE — 77030003666 HC NDL SPINAL BD -A: Performed by: ORTHOPAEDIC SURGERY

## 2021-03-09 PROCEDURE — 74011250637 HC RX REV CODE- 250/637: Performed by: ORTHOPAEDIC SURGERY

## 2021-03-09 DEVICE — PINNACLE GRIPTION ACETABULAR SHELL MULTI-HOLE 58MM OD
Type: IMPLANTABLE DEVICE | Site: HIP | Status: FUNCTIONAL
Brand: PINNACLE GRIPTION

## 2021-03-09 DEVICE — CORAIL HIP SYSTEM CEMENTLESS FEMORAL STEM 12/14 AMT 135 DEGREES KA SIZE 13 HA COATED STANDARD COLLAR
Type: IMPLANTABLE DEVICE | Site: HIP | Status: FUNCTIONAL
Brand: CORAIL

## 2021-03-09 DEVICE — BIOLOX DELTA CERAMIC FEMORAL HEAD +1.5 36MM DIA 12/14 TAPER
Type: IMPLANTABLE DEVICE | Site: HIP | Status: FUNCTIONAL
Brand: BIOLOX DELTA

## 2021-03-09 DEVICE — PINNACLE HIP SOLUTIONS ALTRX POLYETHYLENE ACETABULAR LINER NEUTRAL 36MM ID 58MM OD
Type: IMPLANTABLE DEVICE | Site: HIP | Status: FUNCTIONAL
Brand: PINNACLE ALTRX

## 2021-03-09 DEVICE — PINNACLE CANCELLOUS BONE SCREW 6.5MM X 25MM
Type: IMPLANTABLE DEVICE | Site: HIP | Status: FUNCTIONAL
Brand: PINNACLE

## 2021-03-09 DEVICE — HIP H2 TOT ADV OTHER HD IMPL CAPPED SYNTHES: Type: IMPLANTABLE DEVICE | Status: FUNCTIONAL

## 2021-03-09 RX ORDER — PREDNISONE 5 MG/1
5 TABLET ORAL
Status: DISCONTINUED | OUTPATIENT
Start: 2021-03-09 | End: 2021-03-11 | Stop reason: HOSPADM

## 2021-03-09 RX ORDER — HYDROMORPHONE HYDROCHLORIDE 1 MG/ML
1 INJECTION, SOLUTION INTRAMUSCULAR; INTRAVENOUS; SUBCUTANEOUS
Status: DISCONTINUED | OUTPATIENT
Start: 2021-03-09 | End: 2021-03-11 | Stop reason: HOSPADM

## 2021-03-09 RX ORDER — CELECOXIB 200 MG/1
200 CAPSULE ORAL EVERY 12 HOURS
Status: DISCONTINUED | OUTPATIENT
Start: 2021-03-09 | End: 2021-03-11 | Stop reason: HOSPADM

## 2021-03-09 RX ORDER — SODIUM CHLORIDE, SODIUM LACTATE, POTASSIUM CHLORIDE, CALCIUM CHLORIDE 600; 310; 30; 20 MG/100ML; MG/100ML; MG/100ML; MG/100ML
100 INJECTION, SOLUTION INTRAVENOUS CONTINUOUS
Status: DISCONTINUED | OUTPATIENT
Start: 2021-03-09 | End: 2021-03-09 | Stop reason: HOSPADM

## 2021-03-09 RX ORDER — SODIUM CHLORIDE 0.9 % (FLUSH) 0.9 %
5-40 SYRINGE (ML) INJECTION AS NEEDED
Status: DISCONTINUED | OUTPATIENT
Start: 2021-03-09 | End: 2021-03-09 | Stop reason: HOSPADM

## 2021-03-09 RX ORDER — TRAZODONE HYDROCHLORIDE 50 MG/1
50 TABLET ORAL
Status: DISCONTINUED | OUTPATIENT
Start: 2021-03-09 | End: 2021-03-11 | Stop reason: HOSPADM

## 2021-03-09 RX ORDER — HYDROMORPHONE HYDROCHLORIDE 1 MG/ML
1 INJECTION, SOLUTION INTRAMUSCULAR; INTRAVENOUS; SUBCUTANEOUS
Status: DISCONTINUED | OUTPATIENT
Start: 2021-03-09 | End: 2021-03-09

## 2021-03-09 RX ORDER — BUPIVACAINE HYDROCHLORIDE 7.5 MG/ML
INJECTION, SOLUTION INTRASPINAL
Status: DISCONTINUED | OUTPATIENT
Start: 2021-03-09 | End: 2021-03-09 | Stop reason: HOSPADM

## 2021-03-09 RX ORDER — NALOXONE HYDROCHLORIDE 0.4 MG/ML
0.1 INJECTION, SOLUTION INTRAMUSCULAR; INTRAVENOUS; SUBCUTANEOUS AS NEEDED
Status: DISCONTINUED | OUTPATIENT
Start: 2021-03-09 | End: 2021-03-09 | Stop reason: HOSPADM

## 2021-03-09 RX ORDER — ACETAMINOPHEN 500 MG
1000 TABLET ORAL ONCE
Status: COMPLETED | OUTPATIENT
Start: 2021-03-09 | End: 2021-03-09

## 2021-03-09 RX ORDER — PANTOPRAZOLE SODIUM 40 MG/1
40 TABLET, DELAYED RELEASE ORAL
Status: DISCONTINUED | OUTPATIENT
Start: 2021-03-10 | End: 2021-03-11 | Stop reason: HOSPADM

## 2021-03-09 RX ORDER — CEFAZOLIN SODIUM/WATER 2 G/20 ML
2 SYRINGE (ML) INTRAVENOUS ONCE
Status: COMPLETED | OUTPATIENT
Start: 2021-03-09 | End: 2021-03-09

## 2021-03-09 RX ORDER — MIRTAZAPINE 15 MG/1
30 TABLET, FILM COATED ORAL
Status: DISCONTINUED | OUTPATIENT
Start: 2021-03-09 | End: 2021-03-11 | Stop reason: HOSPADM

## 2021-03-09 RX ORDER — HYDROMORPHONE HYDROCHLORIDE 2 MG/ML
0.5 INJECTION, SOLUTION INTRAMUSCULAR; INTRAVENOUS; SUBCUTANEOUS
Status: DISCONTINUED | OUTPATIENT
Start: 2021-03-09 | End: 2021-03-09 | Stop reason: HOSPADM

## 2021-03-09 RX ORDER — AMOXICILLIN 250 MG
2 CAPSULE ORAL DAILY
Status: DISCONTINUED | OUTPATIENT
Start: 2021-03-10 | End: 2021-03-11 | Stop reason: HOSPADM

## 2021-03-09 RX ORDER — ONDANSETRON 4 MG/1
8 TABLET, ORALLY DISINTEGRATING ORAL
Status: DISCONTINUED | OUTPATIENT
Start: 2021-03-09 | End: 2021-03-11 | Stop reason: HOSPADM

## 2021-03-09 RX ORDER — SODIUM CHLORIDE 0.9 % (FLUSH) 0.9 %
5-40 SYRINGE (ML) INJECTION EVERY 8 HOURS
Status: DISCONTINUED | OUTPATIENT
Start: 2021-03-09 | End: 2021-03-09 | Stop reason: HOSPADM

## 2021-03-09 RX ORDER — SODIUM CHLORIDE 0.9 % (FLUSH) 0.9 %
5-40 SYRINGE (ML) INJECTION AS NEEDED
Status: DISCONTINUED | OUTPATIENT
Start: 2021-03-09 | End: 2021-03-11 | Stop reason: HOSPADM

## 2021-03-09 RX ORDER — MIDAZOLAM HYDROCHLORIDE 1 MG/ML
2 INJECTION, SOLUTION INTRAMUSCULAR; INTRAVENOUS
Status: DISCONTINUED | OUTPATIENT
Start: 2021-03-09 | End: 2021-03-09 | Stop reason: HOSPADM

## 2021-03-09 RX ORDER — ASPIRIN 81 MG/1
81 TABLET ORAL EVERY 12 HOURS
Status: DISCONTINUED | OUTPATIENT
Start: 2021-03-09 | End: 2021-03-11 | Stop reason: HOSPADM

## 2021-03-09 RX ORDER — EPHEDRINE SULFATE/0.9% NACL/PF 50 MG/5 ML
SYRINGE (ML) INTRAVENOUS AS NEEDED
Status: DISCONTINUED | OUTPATIENT
Start: 2021-03-09 | End: 2021-03-09 | Stop reason: HOSPADM

## 2021-03-09 RX ORDER — DIPHENHYDRAMINE HCL 25 MG
25 CAPSULE ORAL
Status: DISCONTINUED | OUTPATIENT
Start: 2021-03-09 | End: 2021-03-11 | Stop reason: HOSPADM

## 2021-03-09 RX ORDER — ROPIVACAINE HYDROCHLORIDE 2 MG/ML
INJECTION, SOLUTION EPIDURAL; INFILTRATION; PERINEURAL AS NEEDED
Status: DISCONTINUED | OUTPATIENT
Start: 2021-03-09 | End: 2021-03-09 | Stop reason: HOSPADM

## 2021-03-09 RX ORDER — ACETAMINOPHEN 500 MG
1000 TABLET ORAL EVERY 6 HOURS
Status: DISCONTINUED | OUTPATIENT
Start: 2021-03-10 | End: 2021-03-10

## 2021-03-09 RX ORDER — LANOLIN ALCOHOL/MO/W.PET/CERES
325 CREAM (GRAM) TOPICAL 2 TIMES DAILY
Status: DISCONTINUED | OUTPATIENT
Start: 2021-03-09 | End: 2021-03-11 | Stop reason: HOSPADM

## 2021-03-09 RX ORDER — OXYCODONE HYDROCHLORIDE 5 MG/1
5 TABLET ORAL
Status: DISCONTINUED | OUTPATIENT
Start: 2021-03-09 | End: 2021-03-09 | Stop reason: HOSPADM

## 2021-03-09 RX ORDER — OXYCODONE HYDROCHLORIDE 5 MG/1
5-10 TABLET ORAL
Status: DISCONTINUED | OUTPATIENT
Start: 2021-03-09 | End: 2021-03-10

## 2021-03-09 RX ORDER — PROPOFOL 10 MG/ML
INJECTION, EMULSION INTRAVENOUS
Status: DISCONTINUED | OUTPATIENT
Start: 2021-03-09 | End: 2021-03-09 | Stop reason: HOSPADM

## 2021-03-09 RX ORDER — PROMETHAZINE HYDROCHLORIDE 25 MG/1
25 TABLET ORAL
Status: DISCONTINUED | OUTPATIENT
Start: 2021-03-09 | End: 2021-03-11 | Stop reason: HOSPADM

## 2021-03-09 RX ORDER — ATORVASTATIN CALCIUM 40 MG/1
80 TABLET, FILM COATED ORAL
Status: DISCONTINUED | OUTPATIENT
Start: 2021-03-09 | End: 2021-03-11 | Stop reason: HOSPADM

## 2021-03-09 RX ORDER — DEXAMETHASONE SODIUM PHOSPHATE 100 MG/10ML
10 INJECTION INTRAMUSCULAR; INTRAVENOUS ONCE
Status: ACTIVE | OUTPATIENT
Start: 2021-03-10 | End: 2021-03-11

## 2021-03-09 RX ORDER — GABAPENTIN 100 MG/1
100 CAPSULE ORAL 2 TIMES DAILY
Status: DISCONTINUED | OUTPATIENT
Start: 2021-03-09 | End: 2021-03-11 | Stop reason: HOSPADM

## 2021-03-09 RX ORDER — NALOXONE HYDROCHLORIDE 0.4 MG/ML
.2-.4 INJECTION, SOLUTION INTRAMUSCULAR; INTRAVENOUS; SUBCUTANEOUS
Status: DISCONTINUED | OUTPATIENT
Start: 2021-03-09 | End: 2021-03-11 | Stop reason: HOSPADM

## 2021-03-09 RX ORDER — CEFAZOLIN SODIUM/WATER 2 G/20 ML
2 SYRINGE (ML) INTRAVENOUS EVERY 8 HOURS
Status: COMPLETED | OUTPATIENT
Start: 2021-03-09 | End: 2021-03-09

## 2021-03-09 RX ORDER — LIDOCAINE HYDROCHLORIDE 10 MG/ML
0.1 INJECTION INFILTRATION; PERINEURAL AS NEEDED
Status: DISCONTINUED | OUTPATIENT
Start: 2021-03-09 | End: 2021-03-09 | Stop reason: HOSPADM

## 2021-03-09 RX ORDER — SODIUM CHLORIDE 0.9 % (FLUSH) 0.9 %
5-40 SYRINGE (ML) INJECTION EVERY 8 HOURS
Status: DISCONTINUED | OUTPATIENT
Start: 2021-03-09 | End: 2021-03-11 | Stop reason: HOSPADM

## 2021-03-09 RX ORDER — AMLODIPINE BESYLATE 10 MG/1
10 TABLET ORAL DAILY
Status: DISCONTINUED | OUTPATIENT
Start: 2021-03-10 | End: 2021-03-11 | Stop reason: HOSPADM

## 2021-03-09 RX ORDER — DIPHENHYDRAMINE HYDROCHLORIDE 50 MG/ML
12.5 INJECTION, SOLUTION INTRAMUSCULAR; INTRAVENOUS
Status: DISCONTINUED | OUTPATIENT
Start: 2021-03-09 | End: 2021-03-09 | Stop reason: HOSPADM

## 2021-03-09 RX ORDER — SODIUM CHLORIDE 9 MG/ML
100 INJECTION, SOLUTION INTRAVENOUS CONTINUOUS
Status: DISPENSED | OUTPATIENT
Start: 2021-03-09 | End: 2021-03-11

## 2021-03-09 RX ORDER — ALBUTEROL SULFATE 0.83 MG/ML
2.5 SOLUTION RESPIRATORY (INHALATION)
Status: DISCONTINUED | OUTPATIENT
Start: 2021-03-09 | End: 2021-03-11 | Stop reason: HOSPADM

## 2021-03-09 RX ORDER — FLUMAZENIL 0.1 MG/ML
0.2 INJECTION INTRAVENOUS
Status: DISCONTINUED | OUTPATIENT
Start: 2021-03-09 | End: 2021-03-09 | Stop reason: HOSPADM

## 2021-03-09 RX ADMIN — MIRTAZAPINE 30 MG: 15 TABLET, FILM COATED ORAL at 22:01

## 2021-03-09 RX ADMIN — SODIUM CHLORIDE, SODIUM LACTATE, POTASSIUM CHLORIDE, AND CALCIUM CHLORIDE: 600; 310; 30; 20 INJECTION, SOLUTION INTRAVENOUS at 11:32

## 2021-03-09 RX ADMIN — ACETAMINOPHEN 1000 MG: 500 TABLET, FILM COATED ORAL at 09:22

## 2021-03-09 RX ADMIN — Medication 10 MG: at 11:09

## 2021-03-09 RX ADMIN — CEFAZOLIN 2 G: 1 INJECTION, POWDER, FOR SOLUTION INTRAVENOUS at 10:43

## 2021-03-09 RX ADMIN — Medication 10 MG: at 11:03

## 2021-03-09 RX ADMIN — FERROUS SULFATE TAB 325 MG (65 MG ELEMENTAL FE) 325 MG: 325 (65 FE) TAB at 18:18

## 2021-03-09 RX ADMIN — Medication 10 MG: at 11:24

## 2021-03-09 RX ADMIN — GABAPENTIN 100 MG: 100 CAPSULE ORAL at 18:18

## 2021-03-09 RX ADMIN — PHENYLEPHRINE HYDROCHLORIDE 100 MCG: 10 INJECTION INTRAVENOUS at 11:46

## 2021-03-09 RX ADMIN — ATORVASTATIN CALCIUM 80 MG: 40 TABLET, FILM COATED ORAL at 22:00

## 2021-03-09 RX ADMIN — BUPIVACAINE HYDROCHLORIDE IN DEXTROSE 12 MG: 7.5 INJECTION, SOLUTION SUBARACHNOID at 10:56

## 2021-03-09 RX ADMIN — TRANEXAMIC ACID 1000 MG: 100 INJECTION, SOLUTION INTRAVENOUS at 11:03

## 2021-03-09 RX ADMIN — PHENYLEPHRINE HYDROCHLORIDE 100 MCG: 10 INJECTION INTRAVENOUS at 11:57

## 2021-03-09 RX ADMIN — Medication 1 AMPULE: at 22:00

## 2021-03-09 RX ADMIN — PHENYLEPHRINE HYDROCHLORIDE 50 MCG: 10 INJECTION INTRAVENOUS at 12:13

## 2021-03-09 RX ADMIN — Medication 3 AMPULE: at 09:22

## 2021-03-09 RX ADMIN — Medication 10 MG: at 11:00

## 2021-03-09 RX ADMIN — OXYCODONE 10 MG: 5 TABLET ORAL at 18:24

## 2021-03-09 RX ADMIN — PROPOFOL 75 MCG/KG/MIN: 10 INJECTION, EMULSION INTRAVENOUS at 11:03

## 2021-03-09 RX ADMIN — Medication 81 MG: at 22:01

## 2021-03-09 RX ADMIN — CEFAZOLIN SODIUM 2 G: 10 INJECTION, POWDER, FOR SOLUTION INTRAVENOUS at 18:18

## 2021-03-09 RX ADMIN — SODIUM CHLORIDE, SODIUM LACTATE, POTASSIUM CHLORIDE, AND CALCIUM CHLORIDE 100 ML/HR: 600; 310; 30; 20 INJECTION, SOLUTION INTRAVENOUS at 09:21

## 2021-03-09 RX ADMIN — Medication 10 ML: at 22:00

## 2021-03-09 RX ADMIN — Medication 10 MG: at 11:14

## 2021-03-09 RX ADMIN — Medication 10 MG: at 11:20

## 2021-03-09 RX ADMIN — CEFAZOLIN SODIUM 2 G: 10 INJECTION, POWDER, FOR SOLUTION INTRAVENOUS at 22:03

## 2021-03-09 RX ADMIN — OXYCODONE 10 MG: 5 TABLET ORAL at 15:14

## 2021-03-09 NOTE — ANESTHESIA PREPROCEDURE EVALUATION
Anesthetic History   No history of anesthetic complications            Review of Systems / Medical History  Patient summary reviewed, nursing notes reviewed and pertinent labs reviewed    Pulmonary          Smoker         Neuro/Psych   Within defined limits           Cardiovascular    Hypertension: well controlled        Dysrhythmias (pAF) : atrial fibrillation  CAD and hyperlipidemia    Exercise tolerance: >4 METS  Comments: TTE 2019: · The left ventricular systolic function is normal (55-65%).  · Normal left ventricular diastolic function.  · The right ventricular systolic function is normal.  · The left atrium is mildly dilated.  · Mild to moderate tricuspid valve regurgitation.  · The ascending aorta is mildly dilated. Aortic valve trileaflet.    Per cards note 3/2021: CAD: The patient had nonobstructive coronary disease on previous left heart catheterization. She denies any symptoms of angina. C   GI/Hepatic/Renal     GERD: well controlled           Endo/Other      Hypothyroidism: well controlled  Arthritis (rheumatoid)     Other Findings              Physical Exam    Airway  Mallampati: III  TM Distance: 4 - 6 cm  Neck ROM: normal range of motion   Mouth opening: Normal     Cardiovascular  Regular rate and rhythm,  S1 and S2 normal,  no murmur, click, rub, or gallop             Dental    Dentition: Full upper dentures     Pulmonary  Breath sounds clear to auscultation               Abdominal         Other Findings            Anesthetic Plan    ASA: 3  Anesthesia type: spinal          Induction: Intravenous  Anesthetic plan and risks discussed with: Patient, Son / Daughter and Family      eliquis held >72 hours

## 2021-03-09 NOTE — PERIOP NOTES
Betadine lavage:  17.5cc of betadine lot #[id number]  , exp. Date 08/21  ,  in 500cc of . 9NS Lot #[id number]  , exp.  Date : 07/2023

## 2021-03-09 NOTE — OP NOTES
[address] [facility]  Total Hip Procedure Note  Patient:Сергей Cox   : 1945  Medical Record Number:[id number]      Pre-operative Diagnosis:  Unilateral primary osteoarthritis, left hip [M16.12]/ Inflammatory arthritis  Post-operative Diagnosis: Unilateral primary osteoarthritis, left hip [M16.12]/Inflammatory Arthritis    Surgeon: Rodolfo Arce MD  Assistant:Anibal Graham PA-C    Anesthesia: Spinal      Procedure: Total Hip Arthroplasty   The complexity of the total joint surgery requires the use of a first assistant for positioning, retraction and assistance in closure. The patient's Body mass index is 20.09 kg/m²., BMI's greater then 40 make surgical exposure and retraction extremely difficult and increase operative time. Shelley Sigala was brought to the operating room and positioned on the operating table. She was anesthestized . IV antibiotics per CMS protocol were administered. Prior to the incision being made a timeout was called identifying the patient, procedure ,operative side and surgeon. Shelley Sigala was positioned in the lateral decubitus position with the  left  side up. The limb was prepped and draped in the usual sterile fashion. The direct lateral approach was utilized to expose the hip. The incision was carried through the subcutaneous tissue and underlying fascia with homeostasis obtained using the bovie cauterization. A Charnley retractor was inserted. The abductors were taken down at the junction of the anterior and middle third. The sciatic nerve was palpated and identified. Following comparison of leg lengths, the femoral head was dislocated. The neck was osteotomized in the appropriate position just above the lesser trochanteric region. The acetabular retractor was placed and the appropriate capsulotomy performed. Soft tissue was removed from the acetabulum. The acetabulum was sequentially reamed.   Using trial components the acetabulum was sized to a 58 mm acetabular cup. Two post/sup screws were placed to augment fiaxtion    Utilizing the femoral retractor, the canal was prepared with appropriate laterization and reamed with the starter reamer. The canal was then broached progressively to size 13. The calcar planar was untilized. A trial reduction with a size +1.5 neck length was utilized. The Head size was 36mm. This was found to be the most stable to flexion greater than 90 degrees and an internal and external rotation. Limb lengths were found to be equilibrated and with appropriate stability as mentioned above. All trial components were removed. The cementless permanent stem was impacted into place. A trial reduction was performed and the appropiate neck length was selected. A permanent 36mm femoral head was impacted into place. Alessandro Contreras's hip was reduced and the stability was as mentioned as above. The betadine lavage protocol was used. The sciatic nerve was palpated and noted to be intact. The abductors were repaired through drill holes in the greater trochanter. The remainder was closed in layers with a Zipline closure for the skin. The patient was then rolled to a supine position. The sponge and needle counts were correct. The patient tolerated the procedure without difficulty and left the operating room in satisfactory condition. EBL:300cc  Additional Findings: Severe DJD secondary to inflammatory arthritis with significant acetabular bone loss/ Osteoporosis/ Severe loss of motion B knees (noted while positioning) 0-45 degrees ROM. Complications: Upon broaching the femur, a posterior GT avulsion fracture occurred. It did not impact implant or hip stability. A repair was incorporated into the standard closure. Implants:   Implant Name Type Inv.  Item Serial No.  Lot No. LRB No. Used Action   CUP ACET [id number] 12 H HIP TI Maryellenganesh Calderaricardo SHASHA REV - [id number]  CUP ACET [id number] 12 H HIP TI GRIPTION VIP TAPR DOME REV  [facility] [id number] Left 1 Implanted   LINER ACET OD58MM ID36MM HIP ALTRX PINN - [id number]  LINER ACET OD58MM ID36MM HIP ALTRX PINN  [facility] [id number] Left 1 Implanted   SCREW BNE L25MM DIA6.5MM CANC HIP S STL GRIPTION FULL THRD - [id number]  SCREW BNE L25MM DIA6.5MM CANC HIP S STL GRIPTION FULL THRD  [facility] [id number] Left 1 Implanted   SCREW BNE L25MM DIA6.5MM CANC HIP S STL GRIPTION FULL THRD - [id number]  SCREW BNE L25MM DIA6.5MM CANC HIP S STL GRIPTION FULL THRD  [facility] [id number] Left 1 Implanted   STEM FEM SZ 13 L135MM NK L38.5MM 135DEG 41.5MM OFFSET STD - [id number]  STEM FEM SZ 13 L135MM NK L38.5MM 135DEG 41.5MM OFFSET STD  [facility] [id number] Left 1 Implanted   HEAD FEM [id number] +1.5MM OFFSET 12/14 TAPR HIP CERAMIC - [id number]  HEAD FEM [id number] +1.5MM OFFSET 12/14 TAPR HIP CERAMIC  [facility] [id number] Left 1 Implanted     Signed By: Shari Ryder MD

## 2021-03-09 NOTE — ANESTHESIA PROCEDURE NOTES
Spinal Block    Start time: 3/9/2021 10:50 AM  End time: 3/9/2021 10:56 AM  Performed by: Ovidio Arndt MD  Authorized by: Ovidio Arndt MD     Pre-procedure:   Indications: primary anesthetic  Preanesthetic Checklist: patient identified, risks and benefits discussed, anesthesia consent, site marked, patient being monitored and timeout performed    Timeout Time: 10:50          Spinal Block:   Patient Position:  Seated  Prep Region:  Lumbar  Prep: chlorhexidine      Location:  L4-5  Technique:  Single shot    Local Dose (mL):  1.6    Needle:   Needle Type:  Pencil-tip  Needle Gauge:  25 G  Attempts:  1      Events: CSF confirmed, no blood with aspiration and no paresthesia        Assessment:  Insertion:  Uncomplicated  Patient tolerance:  Patient tolerated the procedure well with no immediate complications

## 2021-03-09 NOTE — PERIOP NOTES
Was told by pt's daughter to call her brother, Sam Prasad" when finished with case.  [phone]

## 2021-03-09 NOTE — PERIOP NOTES
TRANSFER - OUT REPORT:    Verbal report given to Lisa Tate RN on Grant Flow  being transferred to Room 313 for routine progression of care       Report consisted of patients Situation, Background, Assessment and   Recommendations(SBAR). Information from the following report(s) SBAR was reviewed with the receiving nurse. Opportunity for questions and clarification was provided.       Patient transported with:   [employer]

## 2021-03-09 NOTE — PROGRESS NOTES
's visit attempted in Pre-op. According to staff Ms. Zoya Elliott went to surgery earlier than originally scheduled.      Deepa Santiago [place]  Board Certified

## 2021-03-09 NOTE — INTERVAL H&P NOTE
Update History & Physical 
 
The Patient's History and Physical of March 5, 21 was reviewed with the patient and I examined the patient. There was no change. The surgical site was confirmed by the patient and me. Plan:  The risk, benefits, expected outcome, and alternative to the recommended procedure have been discussed with the patient. Patient understands and wants to proceed with the procedure.  
 
Electronically signed by HENOK Littlejohn on 3/9/2021 at 10:06 AM

## 2021-03-09 NOTE — ANESTHESIA POSTPROCEDURE EVALUATION
Procedure(s):  LEFT HIP ARTHROPLASTY TOTAL/ DEPUY.     spinal    Anesthesia Post Evaluation      Multimodal analgesia: multimodal analgesia used between 6 hours prior to anesthesia start to PACU discharge  Patient location during evaluation: bedside  Patient participation: complete - patient participated  Level of consciousness: awake and alert  Pain management: adequate  Airway patency: patent  Anesthetic complications: no  Cardiovascular status: acceptable  Respiratory status: acceptable  Hydration status: acceptable  Post anesthesia nausea and vomiting:  controlled  Final Post Anesthesia Temperature Assessment:  Normothermia (36.0-37.5 degrees C)      INITIAL Post-op Vital signs:   Vitals Value Taken Time   /59 03/09/21 1323   Temp 37.3 °C (99.2 °F) 03/09/21 1243   Pulse 81 03/09/21 1323   Resp 16 03/09/21 1323   SpO2 97 % 03/09/21 1323

## 2021-03-09 NOTE — REHAB NOTE
PT/OT note: attempted therapy evals this afternoon but patient declined stating she was in too much pain. We will check on her tomorrow morning for evaluations.    Karen Winn, PT

## 2021-03-10 PROBLEM — Z96.642 HISTORY OF TOTAL HIP ARTHROPLASTY, LEFT: Status: ACTIVE | Noted: 2021-03-10

## 2021-03-10 PROCEDURE — 97530 THERAPEUTIC ACTIVITIES: CPT

## 2021-03-10 PROCEDURE — 97535 SELF CARE MNGMENT TRAINING: CPT

## 2021-03-10 PROCEDURE — 97116 GAIT TRAINING THERAPY: CPT

## 2021-03-10 PROCEDURE — 74011250637 HC RX REV CODE- 250/637: Performed by: ORTHOPAEDIC SURGERY

## 2021-03-10 PROCEDURE — 74011250637 HC RX REV CODE- 250/637: Performed by: PHYSICIAN ASSISTANT

## 2021-03-10 PROCEDURE — 2709999900 HC NON-CHARGEABLE SUPPLY

## 2021-03-10 PROCEDURE — 97110 THERAPEUTIC EXERCISES: CPT

## 2021-03-10 PROCEDURE — 97161 PT EVAL LOW COMPLEX 20 MIN: CPT

## 2021-03-10 PROCEDURE — 97165 OT EVAL LOW COMPLEX 30 MIN: CPT

## 2021-03-10 PROCEDURE — 65270000029 HC RM PRIVATE

## 2021-03-10 RX ORDER — HYDROCODONE BITARTRATE AND ACETAMINOPHEN 10; 325 MG/1; MG/1
1-2 TABLET ORAL
Status: DISCONTINUED | OUTPATIENT
Start: 2021-03-10 | End: 2021-03-11 | Stop reason: HOSPADM

## 2021-03-10 RX ORDER — HYDROCODONE BITARTRATE AND ACETAMINOPHEN 10; 325 MG/1; MG/1
1-2 TABLET ORAL
Qty: 60 TAB | Refills: 0 | Status: SHIPPED | OUTPATIENT
Start: 2021-03-10 | End: 2021-03-17

## 2021-03-10 RX ADMIN — ACETAMINOPHEN 1000 MG: 500 TABLET, FILM COATED ORAL at 00:26

## 2021-03-10 RX ADMIN — LEVOTHYROXINE SODIUM 137 MCG: 0.11 TABLET ORAL at 06:46

## 2021-03-10 RX ADMIN — PANTOPRAZOLE SODIUM 40 MG: 40 TABLET, DELAYED RELEASE ORAL at 06:46

## 2021-03-10 RX ADMIN — ATORVASTATIN CALCIUM 80 MG: 40 TABLET, FILM COATED ORAL at 21:26

## 2021-03-10 RX ADMIN — Medication 81 MG: at 08:52

## 2021-03-10 RX ADMIN — Medication 10 ML: at 06:46

## 2021-03-10 RX ADMIN — Medication 81 MG: at 21:26

## 2021-03-10 RX ADMIN — CELECOXIB 200 MG: 200 CAPSULE ORAL at 17:43

## 2021-03-10 RX ADMIN — HYDROCODONE BITARTRATE AND ACETAMINOPHEN 2 TABLET: 10; 325 TABLET ORAL at 13:26

## 2021-03-10 RX ADMIN — HYDROCODONE BITARTRATE AND ACETAMINOPHEN 2 TABLET: 10; 325 TABLET ORAL at 08:52

## 2021-03-10 RX ADMIN — OXYCODONE 10 MG: 5 TABLET ORAL at 07:02

## 2021-03-10 RX ADMIN — HYDROCODONE BITARTRATE AND ACETAMINOPHEN 1 TABLET: 10; 325 TABLET ORAL at 17:44

## 2021-03-10 RX ADMIN — Medication 1 AMPULE: at 08:52

## 2021-03-10 RX ADMIN — OXYCODONE 10 MG: 5 TABLET ORAL at 00:28

## 2021-03-10 RX ADMIN — FERROUS SULFATE TAB 325 MG (65 MG ELEMENTAL FE) 325 MG: 325 (65 FE) TAB at 17:43

## 2021-03-10 RX ADMIN — FERROUS SULFATE TAB 325 MG (65 MG ELEMENTAL FE) 325 MG: 325 (65 FE) TAB at 08:52

## 2021-03-10 RX ADMIN — Medication 1 AMPULE: at 21:25

## 2021-03-10 RX ADMIN — HYDROCODONE BITARTRATE AND ACETAMINOPHEN 1 TABLET: 10; 325 TABLET ORAL at 21:26

## 2021-03-10 RX ADMIN — ACETAMINOPHEN 1000 MG: 500 TABLET, FILM COATED ORAL at 06:46

## 2021-03-10 RX ADMIN — MIRTAZAPINE 30 MG: 15 TABLET, FILM COATED ORAL at 21:26

## 2021-03-10 RX ADMIN — Medication 10 ML: at 21:26

## 2021-03-10 RX ADMIN — GABAPENTIN 100 MG: 100 CAPSULE ORAL at 08:52

## 2021-03-10 RX ADMIN — DOCUSATE SODIUM 50MG AND SENNOSIDES 8.6MG 2 TABLET: 8.6; 5 TABLET, FILM COATED ORAL at 08:52

## 2021-03-10 NOTE — ADDENDUM NOTE
Addendum  created 03/10/21 0751 by Ivan Stone CRNA    Flowsheet accepted, Intraprocedure Flowsheets edited

## 2021-03-10 NOTE — PROGRESS NOTES
Problem: Mobility Impaired (Adult and Pediatric)  Goal: *Acute Goals and Plan of Care (Insert Text)  Outcome: Progressing Towards Goal  Note: GOALS (1-4 days):  (1.)Ms. Jan Armenta will move from supine to sit and sit to supine  in bed with MINIMAL ASSIST.    (2.)Ms. aJn Armenta will transfer from bed to chair and chair to bed with MINIMAL ASSIST using the least restrictive device. (3.)Ms. Jan Armenta will ambulate with MINIMAL ASSIST for 10 feet with the least restrictive device. (4.)Ms. Contreras will ambulate up/down 3 steps with bilateral  railing with MODERATE ASSIST with no device. (5.)Ms. Contreras will state/observe NATE precautions with 0 verbal cues. ________________________________________________________________________________________________       PHYSICAL THERAPY JOINT CAMP NATE: Initial Assessment and AM 3/10/2021  INPATIENT: Hospital Day: 2  Payor: Oren Thomas / [facility]PFFS / Product Type: [employer] Medicare /      NAME/AGE/GENDER: Najma Juarez is a 68 y.o. female   PRIMARY DIAGNOSIS:  Unilateral primary osteoarthritis, left hip [M16.12]   Procedure(s) and Anesthesia Type:     * LEFT HIP ARTHROPLASTY TOTAL/ DEPUY - Spinal (Left)  ICD-10: Treatment Diagnosis:    Pain in left hip (M25.552)  Stiffness of Left Hip, Not elsewhere classified (M25.652)  Difficulty in walking, Not elsewhere classified (R26.2)      ASSESSMENT:     Ms. Jan Armenta presents with significant decrease in functional mobility and gait as well as decreased rom and strength of left LE s/p left nate. She needed assistance to move prior to surgery and has signficant joint limitations from RA. All her joints are limited. She plans to go home with HHPT and family. Spoke with son who assured me there was lots of family to help at home as I told him she will need lots of help. Will follow.     This section established at most recent assessment   PROBLEM LIST (Impairments causing functional limitations):  Decreased Strength  Decreased ADL/Functional Activities  Decreased Transfer Abilities  Decreased Ambulation Ability/Technique  Decreased Balance  Increased Pain  Decreased Activity Tolerance  Decreased Flexibility/Joint Mobility  Decreased [place] with Home Exercise Program  Decreased strength   INTERVENTIONS PLANNED: (Benefits and precautions of physical therapy have been discussed with the patient.)  Bed Mobility  Cold  Gait Training  Home Exercise Program (HEP)  Range of Motion (ROM)  Therapeutic Activites  Therapeutic Exercise/Strengthening  Transfer Training     TREATMENT PLAN: Frequency/Duration: Follow patient BID for duration of hospital stay to address above goals. Rehabilitation Potential For Stated Goals: Fair     RECOMMENDED REHABILITATION/EQUIPMENT: (at time of discharge pending progress): Continue Skilled Therapy and Home Health: Physical Therapy. HISTORY:   History of Present Injury/Illness (Reason for Referral):  S/p left ricardo  Past Medical History/Comorbidities:   Ms. Jan Armenta  has a past medical history of Anxiety, Arthritis, Atrial fibrillation ([address] CAD (coronary artery disease), Cancer ([address] Constipation, Fibromyalgia, GERD (gastroesophageal reflux disease), Hypercholesteremia, Hypertension, Iron deficiency anemia, PAD (peripheral artery disease) ([address] Polyneuropathy, Rheumatoid arthritis ([address] Thyroid disease, Uterine cancer ([address] and Vulva neoplasm. She also has no past medical history of Autoimmune disease ([address] Other ill-defined conditions(799.89), or Unspecified adverse effect of anesthesia.   Ms. Jan Armenta  has a past surgical history that includes hx lap cholecystectomy; hx total abdominal hysterectomy; hx knee replacement (Bilateral); hx hip replacement (Right); hx cataract removal (Left); hx heart catheterization; hx parathyroidectomy; hx other surgical; and hx other surgical.   Social History/Living Environment:   Home Environment: Private residence  # Steps to Enter: [address] to Enter: Yes  Hand Rails : Bilateral  Wheelchair Ramp: Yes  One/Two Story Residence: One story  Living Alone: No  Support Systems: Child(clemente)  Patient Expects to be Discharged to[de-identified] Private residence  Current DME Used/Available at Home: [address], [address], straight;Commode, bedside; Shower chair;Walker, rolling  Tub or Shower Type: Tub/Shower combination    Prior Level of Function/Work/Activity:  Assist with gait and adl's   Number of Personal Factors/Comorbidities that affect the Plan of Care: 3+: HIGH COMPLEXITY   EXAMINATION:   Most Recent Physical Functioning:      Gross Assessment  AROM: Generally decreased, functional(very limited all joints LE's)  Strength: Generally decreased, functional(very limited all joints LE's)          LLE AROM  L Hip Flexion: 75  L Hip ABduction: 5          Bed Mobility  Supine to Sit: Moderate assistance;Assist x2; Additional time  Scooting: Total assistance    Transfers  Sit to Stand: Minimum assistance;Assist x2; Additional time  Stand to Sit: Minimum assistance;Assist x2; Additional time  Bed to Chair: Additional time;Assist x2;Minimum assistance    Balance  Sitting: High guard; With support  Standing: Pull to stand; With support              Weight Bearing Status  Left Side Weight Bearing: As tolerated  Distance (ft): 3 Feet (ft)  Ambulation - Level of Assistance: Minimal assistance;Assist x2; Additional time  Assistive Device: Walker, rolling  Speed/Shannon: Delayed  Step Length: Left shortened;Right shortened  Stance: Left decreased  Gait Abnormalities: Antalgic;Decreased step clearance;Shuffling gait; Foot drop  Interventions: Safety awareness training;Verbal cues     Braces/Orthotics:            Body Structures Involved:  Bones  Joints  Muscles  Ligaments Body Functions Affected: Movement Related Activities and Participation Affected:   Mobility   Number of elements that affect the Plan of Care: 3: MODERATE COMPLEXITY   CLINICAL PRESENTATION:   Presentation: Stable and uncomplicated: LOW COMPLEXITY   CLINICAL DECISION MAKIN[address] [place] [address] AM-PAC 6 Clicks   Basic Mobility Inpatient Short Form  How much difficulty does the patient currently have. .. Unable A Lot A Little None   1. Turning over in bed (including adjusting bedclothes, sheets and blankets)? [] 1   [x] 2   [] 3   [] 4   2. Sitting down on and standing up from a chair with arms ( e.g., wheelchair, bedside commode, etc.)   [] 1   [x] 2   [] 3   [] 4   3. Moving from lying on back to sitting on the side of the bed? [] 1   [x] 2   [] 3   [] 4   How much help from another person does the patient currently need. .. Total A Lot A Little None   4. Moving to and from a bed to a chair (including a wheelchair)? [] 1   [x] 2   [] 3   [] 4   5. Need to walk in hospital room? [x] 1   [] 2   [] 3   [] 4   6. Climbing 3-5 steps with a railing? [x] 1   [] 2   [] 3   [] 4   © 2007, Trustees of [address] [place] [address], under license to [employer]. All rights reserved     Score:  Initial: 10 Most Recent: X (Date: -- )    Interpretation of Tool:  Represents activities that are increasingly more difficult (i.e. Bed mobility, Transfers, Gait). Medical Necessity:     Patient is expected to demonstrate progress in   strength, range of motion, and balance   to   decrease assistance required with exercises and functional mobility  . Reason for Services/Other Comments:  Patient   continues to require present interventions due to patient's inability to perform exercises and functional mobility independently  .    Use of outcome tool(s) and clinical judgement create a POC that gives a: Questionable prediction of patient's progress: MODERATE COMPLEXITY            TREATMENT:   (In addition to Assessment/Re-Assessment sessions the following treatments were rendered)     Pre-treatment Symptoms/Complaints:  hip pain  Pain Initial:      Post Session:  did not rate   assessment  Therapeutic Exercise: (10 Minutes):  Exercises per grid below to improve mobility and strength. Required minimal visual, verbal, and manual cues to promote proper body alignment, promote proper body posture, and promote proper body mechanics. Progressed range and repetitions as indicated. Date:  3/10 Date:   Date:     ACTIVITY/EXERCISE AM PM AM PM AM PM   GROUP THERAPY  []  []  []  []  []  []   Ankle Pumps 10a        Quad Sets 10aa        Gluteal Sets 10a        Hip ABd/ADduction 10aa        Straight Leg Raises         Knee Slides 10aa        Short Arc Quads         Long Arc Quads         Chair Slides                  B = bilateral; AA = active assistive; A = active; P = passive      Treatment/Session Assessment:     Response to Treatment:  did ok, will be slow progress. Education:  [x] Home Exercises  [x] Fall Precautions  [x] Hip Precautions [] D/C Instruction Review  [x] Hip Prosthesis Review  [x] Walker Management/Safety [] Adaptive Equipment as Needed       Interdisciplinary Collaboration:   Occupational Therapist  Registered Nurse    After treatment position/precautions:   Up in chair  Bed/Chair-wheels locked  Bed in low position  Caregiver at bedside  Call light within reach  Family at bedside    Compliance with Program/Exercises: Will assess as treatment progresses. Recommendations/Intent for next treatment session:  Treatment next visit will focus on increasing Ms. Contreras's independence with bed mobility, transfers, gait training, strength/ROM exercises, modalities for pain, and patient education.       Total Treatment Duration:  PT Patient Time In/Time Out  Time In: 0815  Time Out: 604 [address] [place] [address]

## 2021-03-10 NOTE — PROGRESS NOTES
Problem: Self Care Deficits Care Plan (Adult)  Goal: *Acute Goals and Plan of Care (Insert Text)  Description: GOALS:   DISCHARGE GOALS (in preparation for going home/rehab):  3 days  1. Ms. Jan Armenta will perform upper body dressing activity with max assistance. 2.  Ms. Jan Armenta will perform one upper body bathing with max body bathing activity with max assistance. 3.  Ms. Jan Armenta will perform  toilet transfer with mod assist x 1 to min assist with adaptive equipment to demonstrate improved functional mobility and safety. 4. Ms. Jan Armenta will perform  Standing with RW with CGA for bathing, dressing and toileting using RW for balance. Outcome: Progressing Towards Goal       JOINT CAMP OCCUPATIONAL THERAPY NATE: Initial Assessment and AM 3/10/2021  INPATIENT: Hospital Day: 2  Payor: Oren Thomas / Plan: [facility] HUMANA MEDICARE CHOICE PPO/PFFS / Product Type: Managed Care Medicare /      NAME/AGE/GENDER: Najma Juarez is a 68 y.o. female   PRIMARY DIAGNOSIS:  Unilateral primary osteoarthritis, left hip [M16.12]   Procedure(s) and Anesthesia Type:     * LEFT HIP ARTHROPLASTY TOTAL/ DEPUY - Spinal (Left)  ICD-10: Treatment Diagnosis:    · Pain in left hip (M25.552)  · Stiffness of Left Hip, Not elsewhere classified (M25.652)  · Generalized Muscle Weakness (M62.81)  · Other lack of cordination (R27.8)  · Difficulty in walking, Not elsewhere classified (R26.2)  · Other abnormalities of gait and mobility (R26.89)      ASSESSMENT:     Ms. Jan Armenta is s/p left NATE and presents with decreased weight bearing on left LE and decreased independence with functional mobility and activities of daily living as compared to prior level of function and safety. Patient would benefit from skilled Occupational Therapy to maximize independence and safety with self-care task and functional mobility. Pt would also benefit from education on lower body adaptive equipment and hip precautions post-surgery in preparation for going home. Patient plans for further rehab at home with home health services and good family support. OT reviewed therapy schedule and plan of care with patient. Patient was able to transfer and perform self care skills as charted below. Patient instructed to call for assistance when needing to get up from the recliner and all needs in reach. Patient verbalized understanding of call light. Patient mod assist x 2 supine to sit. She stood and took steps to recliner with min x 2. She has B hand deformities. Her son in present. OT to return later this am for ADL session. She is working with  PT. This section established at most recent assessment   PROBLEM LIST (Impairments causing functional limitations):  1. Decreased Strength  2. Decreased ADL/Functional Activities  3. Decreased Transfer Abilities  4. Increased Pain  5. Increased Fatigue  6. Decreased Flexibility/Joint Mobility  7. Decreased Knowledge of Precautions   INTERVENTIONS PLANNED: (Benefits and precautions of occupational therapy have been discussed with the patient.)  1. Activities of daily living training  2. Adaptive equipment training  3. Balance training  4. Clothing management  5. Donning&doffing training  6. Theraputic activity     TREATMENT PLAN: Frequency/Duration: Follow patient 2-3 times to address above goals. Rehabilitation Potential For Stated Goals: Good     RECOMMENDED REHABILITATION/EQUIPMENT: (at time of discharge pending progress): Continue Skilled Therapy. OCCUPATIONAL PROFILE AND HISTORY:   History of Present Injury/Illness (Reason for Referral): Pt presents this date s/p (left) NATE.     Past Medical History/Comorbidities:   Ms. Irina Gan  has a past medical history of Anxiety, Arthritis, Atrial fibrillation ([address] CAD (coronary artery disease), Cancer ([address] Constipation, Fibromyalgia, GERD (gastroesophageal reflux disease), Hypercholesteremia, Hypertension, Iron deficiency anemia, PAD (peripheral artery disease) ([address] Polyneuropathy, Rheumatoid arthritis ([address] Thyroid disease, Uterine cancer ([address] and Vulva neoplasm. She also has no past medical history of Autoimmune disease ([address] Other ill-defined conditions(799.89), or Unspecified adverse effect of anesthesia. Ms. Jan Armenta  has a past surgical history that includes hx lap cholecystectomy; hx total abdominal hysterectomy; hx knee replacement (Bilateral); hx hip replacement (Right); hx cataract removal (Left); hx heart catheterization; hx parathyroidectomy; hx other surgical; and hx other surgical.  Social History/Living Environment:   Home Environment: Private residence  # Steps to Enter: 3  Wheelchair Ramp: Yes  One/Two Story Residence: One story  Living Alone: No  Support Systems: Child(clemente)  Patient Expects to be Discharged to[de-identified] Private residence  Current DME Used/Available at Home: Minta Ruffing, straight, Commode, bedside, Shower chair, Walker, rolling  Tub or Shower Type: Tub/Shower combination  Prior Level of Function/Work/Activity:  Assist with ADLs, CGA for mobility with rollator? W/c? Number of Personal Factors/Comorbidities that affect the Plan of Care: Brief history (0):  LOW COMPLEXITY   ASSESSMENT OF OCCUPATIONAL PERFORMANCE[de-identified]   Most Recent Physical Functioning:   Balance  Sitting: High guard; With support  Standing: Pull to stand; With support                    Coordination  Fine Motor Skills-Upper: Left Impaired;Right Impaired(B hand deformities)         Mental Status  Neurologic State: Alert; Appropriate for age  Orientation Level: Appropriate for age  Cognition: Appropriate decision making; Appropriate for age attention/concentration; Appropriate safety awareness; Follows commands  Perception: Cues to maintain midline in standing  Perseveration: No perseveration noted  Safety/Judgement: Awareness of environment; Fall prevention                Basic ADLs (From Assessment) Complex ADLs (From Assessment)   Basic ADL  Feeding: Supervision(drinking her coffee)  Oral Facial Hygiene/Grooming: Moderate assistance  Bathing: Maximum assistance  Upper Body Dressing: Maximum assistance  Lower Body Dressing: Total assistance  Toileting: Total assistance     Grooming/Bathing/Dressing Activities of Daily Living     Cognitive Retraining  Safety/Judgement: Awareness of environment; Fall prevention                 Functional Transfers  Toilet Transfer : Minimum assistance;Assist x2  Shower Transfer: Minimum assistance;Assist x2     Bed/Mat Mobility  Supine to Sit: Moderate assistance;Assist x2  Sit to Stand: Minimum assistance;Assist x2  Stand to Sit: Minimum assistance;Assist x2  Bed to Chair: Minimum assistance;Assist x2  Scooting: Total assistance         Physical Skills Involved:  1. Range of Motion  2. Balance  3. Strength  4. Activity Tolerance Cognitive Skills Affected (resulting in the inability to perform in a timely and safe manner): 1. none Psychosocial Skills Affected:  1. Habits/Routines  2. Environmental Adaptation  3. Self-Awareness   Number of elements that affect the Plan of Care: 3-5:  MODERATE COMPLEXITY   CLINICAL DECISION MAKIN[address] [place] [id number] AM-PAC 6 Clicks   Daily Activity Inpatient Short Form  How much help from another person does the patient currently need. .. Total A Lot A Little None   1. Putting on and taking off regular lower body clothing? [x] 1   [] 2   [] 3   [] 4   2. Bathing (including washing, rinsing, drying)? [] 1   [x] 2   [] 3   [] 4   3. Toileting, which includes using toilet, bedpan or urinal?   [x] 1   [] 2   [] 3   [] 4   4. Putting on and taking off regular upper body clothing? [] 1   [x] 2   [] 3   [] 4   5. Taking care of personal grooming such as brushing teeth? [] 1   [x] 2   [] 3   [] 4   6. Eating meals? [] 1   [] 2   [x] 3   [] 4   © 2007, Trustees of [address] [place] [id number], under license to [employer].  All rights reserved     Score:  Initial:11 Most Recent: X (Date: -- )    Interpretation of Tool:  Represents activities that are increasingly more difficult (i.e. Bed mobility, Transfers, Gait). Medical Necessity:     · Patient is expected to demonstrate progress in   · Self care skills and functional mobility  ·     Reason for Services/Other Comments:  · Patient continues to require skilled intervention due to   · Above listed deficits     Use of outcome tool(s) and clinical judgement create a POC that gives a: MODERATE COMPLEXITY            TREATMENT:   (In addition to Assessment/Re-Assessment sessions the following treatments were rendered)     Pre-treatment Symptoms/Complaints:    Pain: Initial:   Pain Intensity 1: 3  Pain Location 1: Hip  Pain Orientation 1: Left  Pain Intervention(s) 1: Ambulation/Increased Activity  Post Session:  5/10 rest     Assessment/Reassessment only, no treatment provided today    Treatment/Session Assessment:     Response to Treatment:  tolerated well    Education:  [] Home Exercises  [x] Fall Precautions  [x] Hip Precautions [] Going Home Video  [] Knee/Hip Prosthesis Review  [x] Walker Management/Safety [x] Adaptive Equipment as Needed       Interdisciplinary Collaboration:   o Physical Therapist  o Occupational Therapist  o Registered Nurse    After treatment position/precautions:   o Up in chair  o Bed/Chair-wheels locked  o Bed in low position  o Caregiver at bedside  o Call light within reach  o RN notified  o Family at bedside     Compliance with Program/Exercises: Will assess as treatment progresses. Recommendations/Intent for next treatment session:  Treatment next visit will focus on increasing Ms. Contreras's independence with bed mobility, transfers, self care, functional mobility, modalities for pain, and patient education.       Total Treatment Duration:  OT Patient Time In/Time Out  Time In: 0805  Time Out: [address], OT

## 2021-03-10 NOTE — PROGRESS NOTES
Problem: Self Care Deficits Care Plan (Adult)  Goal: *Acute Goals and Plan of Care (Insert Text)  Description: GOALS:   DISCHARGE GOALS (in preparation for going home/rehab):  3 days  1. Ms. Valentino Aranda will perform upper body dressing activity with max assistance. PROGRESSING  2. Ms. Valentino Aranda will perform one upper body bathing with max body bathing activity with max assistance. 3.  Ms. Valentino Aranda will perform  toilet transfer with mod assist x 1 to min assist with adaptive equipment to demonstrate improved functional mobility and safety. 4. Ms. Valentino Aranda will perform  Standing with RW with CGA for bathing, dressing and toileting using RW for balance. Outcome: Progressing Towards Goal       JOINT CAMP OCCUPATIONAL THERAPY NATE: Initial Assessment and AM 3/10/2021  INPATIENT: Hospital Day: 2  Payor: Edison Schaeffer / Plan: [facility] [employer] MEDICARE CHOICE PPO/PFFS / Product Type: [employer] Medicare /      NAME/AGE/GENDER: Shelley Sigala is a 68 y.o. female   PRIMARY DIAGNOSIS:  Unilateral primary osteoarthritis, left hip [M16.12]   Procedure(s) and Anesthesia Type:     * LEFT HIP ARTHROPLASTY TOTAL/ DEPUY - Spinal (Left)  ICD-10: Treatment Diagnosis:    · Pain in left hip (M25.552)  · Stiffness of Left Hip, Not elsewhere classified (M25.652)  · Generalized Muscle Weakness (M62.81)  · Other lack of cordination (R27.8)  · Difficulty in walking, Not elsewhere classified (R26.2)  · Other abnormalities of gait and mobility (R26.89)      ASSESSMENT:     Ms. Valentino Aranda is s/p left NATE and presents with decreased weight bearing on left LE and decreased independence with functional mobility and activities of daily living as compared to prior level of function and safety. Patient would benefit from skilled Occupational Therapy to maximize independence and safety with self-care task and functional mobility.   Pt would also benefit from education on lower body adaptive equipment and hip precautions post-surgery in preparation for going home. Patient plans for further rehab at home with home health services and good family support. OT reviewed therapy schedule and plan of care with patient. Patient was able to transfer and perform self care skills as charted below. Patient instructed to call for assistance when needing to get up from the recliner and all needs in reach. Patient verbalized understanding of call light. Patient mod assist x 2 supine to sit. She stood and took steps to recliner with min x 2. She has B hand deformities. Her son in present. OT to return later this am for ADL session. She is working with  PT.    3/10/21 1140 Juvenal reported her daughter would sponge bath her while she stood at the sink at home. Her daughter would assist her with all toileting as well. Ms. Fredi Griffin completed sponge bath from recliner and stood with min x 2 for washing isacc areas and donning clean brief. She returned to recliner and donned night gown. She plans to return home with  Daughter and family to assist. Recommend HH OT/PT/ aide. She would continue to benefit from skilled OT. Will follow. This section established at most recent assessment   PROBLEM LIST (Impairments causing functional limitations):  1. Decreased Strength  2. Decreased ADL/Functional Activities  3. Decreased Transfer Abilities  4. Increased Pain  5. Increased Fatigue  6. Decreased Flexibility/Joint Mobility  7. Decreased Knowledge of Precautions   INTERVENTIONS PLANNED: (Benefits and precautions of occupational therapy have been discussed with the patient.)  1. Activities of daily living training  2. Adaptive equipment training  3. Balance training  4. Clothing management  5. Donning&doffing training  6. Theraputic activity     TREATMENT PLAN: Frequency/Duration: Follow patient 2-3 times to address above goals. Rehabilitation Potential For Stated Goals: Good     RECOMMENDED REHABILITATION/EQUIPMENT: (at time of discharge pending progress): Continue Skilled Therapy. OCCUPATIONAL PROFILE AND HISTORY:   History of Present Injury/Illness (Reason for Referral): Pt presents this date s/p (left) NATE. Past Medical History/Comorbidities:   Ms. Shanti Headley  has a past medical history of Anxiety, Arthritis, Atrial fibrillation ([address] CAD (coronary artery disease), Cancer ([address] Constipation, Fibromyalgia, GERD (gastroesophageal reflux disease), Hypercholesteremia, Hypertension, Iron deficiency anemia, PAD (peripheral artery disease) ([address] Polyneuropathy, Rheumatoid arthritis ([address] Thyroid disease, Uterine cancer ([address] and Vulva neoplasm. She also has no past medical history of Autoimmune disease ([address] Other ill-defined conditions(799.89), or Unspecified adverse effect of anesthesia. Ms. Shanti Headley  has a past surgical history that includes hx lap cholecystectomy; hx total abdominal hysterectomy; hx knee replacement (Bilateral); hx hip replacement (Right); hx cataract removal (Left); hx heart catheterization; hx parathyroidectomy; hx other surgical; and hx other surgical.  Social History/Living Environment:   Home Environment: Private residence  # Steps to Enter: 3  Wheelchair Ramp: Yes  One/Two Story Residence: One story  Living Alone: No  Support Systems: Child(clemente)  Patient Expects to be Discharged to[de-identified] Private residence  Current DME Used/Available at Home: [place], straight, Commode, bedside, Shower chair, Walker, rolling  Tub or Shower Type: Tub/Shower combination  Prior Level of Function/Work/Activity:  Assist with ADLs, CGA for mobility with rollator? W/c? Number of Personal Factors/Comorbidities that affect the Plan of Care: Brief history (0):  LOW COMPLEXITY   ASSESSMENT OF OCCUPATIONAL PERFORMANCE[de-identified]   Most Recent Physical Functioning:   Balance  Sitting: High guard; With support  Standing: Pull to stand; With support                    Coordination  Fine Motor Skills-Upper: Left Impaired;Right Impaired(B hand deformities)         Mental Status  Neurologic State: Alert; Appropriate for age  Orientation Level: Appropriate for age  Cognition: Appropriate decision making; Appropriate for age attention/concentration; Appropriate safety awareness; Follows commands  Perception: Cues to maintain midline in standing  Perseveration: No perseveration noted  Safety/Judgement: Awareness of environment; Fall prevention                Basic ADLs (From Assessment) Complex ADLs (From Assessment)   Basic ADL  Feeding: Supervision(drinking her coffee)  Oral Facial Hygiene/Grooming: Maximum assistance  Bathing: Maximum assistance  Type of Bath: Chlorhexidine (CHG), Bath Pack  Upper Body Dressing: Maximum assistance  Lower Body Dressing: Total assistance  Toileting: Total assistance     Grooming/Bathing/Dressing Activities of Daily Living     Cognitive Retraining  Safety/Judgement: Awareness of environment; Fall prevention   Upper Body Bathing  Bathing Assistance: Maximum assistance  Position Performed: Seated in chair     Lower Body Bathing  Bathing Assistance: Total assistance(dependent)  Perineal  : Total assistance (dependent)  Position Performed: Standing  Lower Body : Total assistance (dependent)  Position Performed: Seated in chair Toileting  Toileting Assistance: Total assistance(dependent)  Bladder Hygiene: Total assistance (dependent)  Bowel Hygiene: Total assistance (dependent)  Clothing Management: Total assistance (dependent)   Upper Body [address] Gown: Maximum assistance  Pullover Shirt: Maximum assistance; Total assistance (dependent)(pullover night gown) Functional Transfers  Toilet Transfer : Minimum assistance;Assist x2  Shower Transfer: Minimum assistance;Assist x2   Lower Body Dressing Assistance  Socks: Total assistance (dependent) Bed/Mat Mobility  Supine to Sit: Moderate assistance;Assist x2  Sit to Stand: Minimum assistance;Assist x2  Stand to Sit: Minimum assistance;Assist x2  Bed to Chair: Minimum assistance;Assist x2  Scooting:  Total assistance Physical Skills Involved:  1. Range of Motion  2. Balance  3. Strength  4. Activity Tolerance Cognitive Skills Affected (resulting in the inability to perform in a timely and safe manner): 1. none Psychosocial Skills Affected:  1. Habits/Routines  2. Environmental Adaptation  3. Self-Awareness   Number of elements that affect the Plan of Care: 3-5:  MODERATE COMPLEXITY   CLINICAL DECISION MAKING:   [facility] AM-PAC 6 Clicks   Daily Activity Inpatient Short Form  How much help from another person does the patient currently need. .. Total A Lot A Little None   1. Putting on and taking off regular lower body clothing? [x] 1   [] 2   [] 3   [] 4   2. Bathing (including washing, rinsing, drying)? [] 1   [x] 2   [] 3   [] 4   3. Toileting, which includes using toilet, bedpan or urinal?   [x] 1   [] 2   [] 3   [] 4   4. Putting on and taking off regular upper body clothing? [] 1   [x] 2   [] 3   [] 4   5. Taking care of personal grooming such as brushing teeth? [] 1   [x] 2   [] 3   [] 4   6. Eating meals? [] 1   [] 2   [x] 3   [] 4   © 2007, Trustees of [facility], under license to [employer]. All rights reserved     Score:  Initial:11 Most Recent: X (Date: -- )    Interpretation of Tool:  Represents activities that are increasingly more difficult (i.e. Bed mobility, Transfers, Gait).      Medical Necessity:     · Patient is expected to demonstrate progress in   · Self care skills and functional mobility  ·     Reason for Services/Other Comments:  · Patient continues to require skilled intervention due to   · Above listed deficits     Use of outcome tool(s) and clinical judgement create a POC that gives a: MODERATE COMPLEXITY            TREATMENT:   (In addition to Assessment/Re-Assessment sessions the following treatments were rendered)     Pre-treatment Symptoms/Complaints:    Pain: Initial:   Pain Intensity 1: 5  Pain Location 1: Hip  Pain Orientation 1: Left  Pain Intervention(s) 1: Ambulation/Increased Activity  Post Session:  5/10 rest     Self Care: (15): Procedure(s) (per grid) utilized to improve and/or restore self-care/home management as related to dressing, bathing, toileting and sit to stand assistance. Required maximal visual, verbal, manual and tactile cueing to facilitate activities of daily living skills and compensatory activities. Treatment/Session Assessment:     Response to Treatment:  tolerated well, decreased standing balance in standing    Education:  [] Home Exercises  [x] Fall Precautions  [x] Hip Precautions [] Going Home Video  [] Knee/Hip Prosthesis Review  [x] Walker Management/Safety [x] Adaptive Equipment as Needed       Interdisciplinary Collaboration:   o Physical Therapist  o Occupational Therapist  o Registered Nurse  o Certified Nursing Assistant/Patient Care Technician    After treatment position/precautions:   o Up in chair  o Bed/Chair-wheels locked  o Bed in low position  o Call light within reach  o RN notified     Compliance with Program/Exercises: Compliant all of the time. Recommendations/Intent for next treatment session:  Treatment next visit will focus on increasing Ms. Contreras's independence with bed mobility, transfers, self care, functional mobility, modalities for pain, and patient education.       Total Treatment Duration:15  OT Patient Time In/Time Out  Time In: 1140  Time Out: [facility],

## 2021-03-10 NOTE — PROGRESS NOTES
Care Management Interventions  PCP Verified by CM: Yes  Mode of Transport at Discharge: Self  Transition of Care Consult (CM Consult): [address]: Yes  Discharge Durable Medical Equipment: No  Physical Therapy Consult: Yes  Current Support Network: Own Home  Confirm Follow Up Transport: Family  The Plan for Transition of Care is Related to the Following Treatment Goals : return to independent function  The Patient and/or Patient Representative was Provided with a Choice of Provider and Agrees with the Discharge Plan?: Yes  Freedom of Choice List was Provided with Basic Dialogue that Supports the Patient's Individualized Plan of Care/Goals, Treatment Preferences and Shares the Quality Data Associated with the Providers?: Yes  Discharge Location  Discharge Placement: Home with home health  Patient is a 68y.o. year old female admitted for Left NATE . Patient plans to return home on discharge. Order received to arrange home health. Patient without preference towards agency. Referral sent to [facility]. Patient has a rollator walker and raised toilet seat. Pt asking for a \"taller walker\"; does not think her rollator will adjust taller. Family plan to get her walker from the car and bring it to the room so our therapist can evaluate it. Pt reports her rollator was purchased by [address] in the last 5 yrs so will not qualify for another walker under [address]. Will check with PT and OT to see if OT and aide recommended on d/c. Will follow until discharge. OT did recommend adding OT and aide to [facility] so [facility] order was revised and Suzette with [facility] aware.

## 2021-03-10 NOTE — PROGRESS NOTES
03/09/21 2017   Oxygen Therapy   O2 Sat (%) 97 %   Pulse via Oximetry 84 beats per minute   O2 Device Room air   O2 Flow Rate (L/min) 0 l/min   Patient placed on continuous sat monitor. Patient sound asleep. Family at bedside. NAD.

## 2021-03-10 NOTE — DISCHARGE SUMMARY
[address] [place] [address]  Total Joint Discharge Summary      Patient ID:  Nithya Jiménez  [id number]  68 y.o.  1945    Admit date: 3/9/2021  Discharge date and time: 3-10-21  Admitting Physician: Sylvie Graves MD  Surgeon: Same  Admission Diagnoses: Unilateral primary osteoarthritis, left hip [M16.12]  Arthritis of left hip [M16.12]  Discharge Diagnoses: Principal Problem:    History of total hip arthroplasty, left (3/10/2021)    Active Problems:    Arthritis of left hip (3/9/2021)                                Perioperative Antibiotics: Ancef 1 to 2 mg was given depending on patient's weight. If allergic to Ancef or due to other indications, patient was given Vancomycin. Hospital Medications given:   [unfilled]  [unfilled]  [unfilled]    Discharge Medications given:  Current Discharge Medication List      CONTINUE these medications which have CHANGED    Details   apixaban (Eliquis) 5 mg tablet Take 1 Tab by mouth two (2) times a day. Qty: 1 Tab, Refills: 0      HYDROcodone-acetaminophen (NORCO)  mg tablet Take 1-2 Tabs by mouth every four (4) hours as needed for Pain (do not exceed 10 tablets over 24 hour period) for up to 7 days. Max Daily Amount: 12 Tabs. Qty: 60 Tab, Refills: 0    Associated Diagnoses: History of total hip arthroplasty, left         CONTINUE these medications which have NOT CHANGED    Details   atorvastatin (Lipitor) 80 mg tablet Take 80 mg by mouth nightly. food supplemt, lactose-reduced (Ensure Original) liqd Take 1 Bottle by mouth three (3) times daily. Ensue Original      ferrous sulfate (Iron) 325 mg (65 mg iron) tablet Take 325 mg by mouth two (2) times a day.      gabapentin (NEURONTIN) 100 mg capsule Take 100 mg by mouth two (2) times a day. mirtazapine (Remeron) 30 mg tablet Take 30 mg by mouth nightly. omeprazole (PRILOSEC) 40 mg capsule Take 40 mg by mouth two (2) times a day.       aspirin delayed-release 81 mg tablet Take 81 mg by mouth every morning. glucosamine sulfate 500 mg capsule Take  by mouth daily. predniSONE (DELTASONE) 5 mg tablet Take 5 mg by mouth daily as needed. levothyroxine (SYNTHROID) 125 mcg tablet Take 137 mcg by mouth Daily (before breakfast). Take morning of surgery per anesthesia guidelines. Indications: a condition with low thyroid hormone levels      amLODIPine (NORVASC) 10 mg tablet Take 10 mg by mouth daily. Take morning of surgery per anesthesia guidelines. Indications: HYPERTENSION      docusate sodium (STOOL SOFTENER) 100 mg capsule Take 100 mg by mouth nightly as needed. hydroxychloroquine (PLAQUENIL) 200 mg tablet Take 200 mg by mouth daily as needed. Indications: rheumatoid arthritis      calcium-vitamin D (OS-YVONNE +D3) 500 mg-200 unit per tablet Take 1 Tab by mouth two (2) times daily (with meals). traZODone (DESYREL) 50 mg tablet Take 50 mg by mouth nightly as needed for Sleep. STOP taking these medications       fish oil-omega-3 fatty acids (Fish Oil) 340-1,000 mg capsule Comments:   Reason for Stopping:         flaxseed oil 1,000 mg cap Comments:   Reason for Stopping:         turmeric 400 mg cap Comments:   Reason for Stopping:         multivitamin (ONE A DAY) tablet Comments:   Reason for Stopping:         naproxen (NAPROSYN) 500 mg tablet Comments:   Reason for Stopping:                Additional DVT Prophylaxis:  MARCUS Hose,Plexi-Pulse    Postoperative transfusions:   none  Post Op complications: none    Hemoglobin at discharge:   Lab Results   Component Value Date/Time    HGB 8.9 (L) 03/09/2021 06:50 PM       Wound appears to be healing without any evidence of infection. Physical Therapy started on the day following surgery and progressed to independent ambulation with the aid of a walker. At the time of discharge, able to go up and down stairs and had understanding of precautions needed following surgery.       PT/OT:            Assistive Device: Fall prevention device                Discharged to: home    Discharge instructions:  -Rx pain medication given  - Anticoagulate with: Ecotrin 81 mg PO BID x 4 weeks  -Resume pre hospital diet             -Resume home medications per medical continuation form     -Ambulate with walker, appropriate total joint protocol  -Follow up in office as scheduled       Signed:  HENOK Felton  3/10/2021  7:32 AM

## 2021-03-10 NOTE — PROGRESS NOTES
March 10, 2021         Post Op day: 1 Day Post-Op     Admit Date: 3/9/2021  Admit Diagnosis: Unilateral primary osteoarthritis, left hip [M16.12]  Arthritis of left hip [M16.12]        Subjective: having some pain currently, No SOB, No Chest Pain, No Nausea or Vomiting     Objective:   Vital Signs are Stable, No Acute Distress, Alert and Oriented, Dressing is Dry,  Neurovascular exam is normal.     Assessment / Plan :  Patient Active Problem List   Diagnosis Code    Osteoarthritis of left knee M17.12    Total knee replacement status Z96.659    Osteoarthritis M19.90    S/P total hip arthroplasty Z96.649    Arthritis of left hip M16.12    History of total hip arthroplasty, left A52.514      Patient Vitals for the past 8 hrs:   BP Temp Pulse Resp SpO2   03/10/21 0420 (P) 110/75 (P) 98.9 °F (37.2 °C) (P) 85 (P) 16 (P) 96 %   03/10/21 0030 109/73 100.2 °F (37.9 °C) 94 16 97 %    Temp (24hrs), Av °F (37.2 °C), Min:98.2 °F (36.8 °C), Max:100.2 °F (37.9 °C)    Body mass index is 20.09 kg/m². Lab Results   Component Value Date/Time    HGB 8.9 (L) 2021 06:50 PM      Pt seen by and discussed with Supervising Physician   Continue PT, current pain meds  Home today if therapy goes well. Otherwise home tomorrow.        Signed By: HENOK Hoyt

## 2021-03-10 NOTE — PROGRESS NOTES
Problem: Self Care Deficits Care Plan (Adult)  Goal: *Acute Goals and Plan of Care (Insert Text)  Description: GOALS:   DISCHARGE GOALS (in preparation for going home/rehab):  3 days  1. Ms. Deborah Phan will perform upper body dressing activity with max assistance. PROGRESSING  2. Ms. Deborah Phan will perform one upper body bathing with max body bathing activity with max assistance. 3.  Ms. Deborah Phan will perform  toilet transfer with mod assist x 1 to min assist with adaptive equipment to demonstrate improved functional mobility and safety. 4. Ms. Deborah Phan will perform  Standing with RW with CGA for bathing, dressing and toileting using RW for balance. Outcome: Progressing Towards Goal       JOINT CAMP OCCUPATIONAL THERAPY NATE: Initial Assessment and AM 3/10/2021  INPATIENT: Hospital Day: 2  Payor: Alie Moser / Plan: [facility] HU[employer]NA MEDICARE CHOICE PPO/PFFS / Product Type: [employer] Care Medicare /      NAME/AGE/GENDER: Bard De Los Santos is a 68 y.o. female   PRIMARY DIAGNOSIS:  Unilateral primary osteoarthritis, left hip [M16.12]   Procedure(s) and Anesthesia Type:     * LEFT HIP ARTHROPLASTY TOTAL/ DEPUY - Spinal (Left)  ICD-10: Treatment Diagnosis:    · Pain in left hip (M25.552)  · Stiffness of Left Hip, Not elsewhere classified (M25.652)  · Generalized Muscle Weakness (M62.81)  · Other lack of cordination (R27.8)  · Difficulty in walking, Not elsewhere classified (R26.2)  · Other abnormalities of gait and mobility (R26.89)      ASSESSMENT:     Ms. Deborah Phan is s/p left NATE and presents with decreased weight bearing on left LE and decreased independence with functional mobility and activities of daily living as compared to prior level of function and safety. Patient would benefit from skilled Occupational Therapy to maximize independence and safety with self-care task and functional mobility.   Pt would also benefit from education on lower body adaptive equipment and hip precautions post-surgery in preparation for going home. Patient plans for further rehab at home with home health services and good family support. OT reviewed therapy schedule and plan of care with patient. Patient was able to transfer and perform self care skills as charted below. Patient instructed to call for assistance when needing to get up from the recliner and all needs in reach. Patient verbalized understanding of call light. Patient mod assist x 2 supine to sit. She stood and took steps to recliner with min x 2. She has B hand deformities. Her son in present. OT to return later this am for ADL session. She is working with  PT.    3/10/21 1140 Juvenal reported her daughter would sponge bath her while she stood at the sink at home. Her daughter would assist her with all toileting as well. Ms. Safia Brunson completed sponge bath from recliner and stood with min x 2 for washing isacc areas and donning clean brief. She returned to recliner and donned night gown. She plans to return home with  Daughter and family to assist. Recommend HH OT/PT/ aide. She would continue to benefit from skilled OT. Will follow. 3/10/21 1450 Co treat with PT to transfer from recliner to bed with min x 2. She took more steps then earlier this am. She was able to side step to hob with  Min x 2. She sat and then was mod x 2 sit to supine. She is planning on discharging home with family to assist her. She would continue to benefit from skilled OT . Will continue with OT POC. This section established at most recent assessment   PROBLEM LIST (Impairments causing functional limitations):  1. Decreased Strength  2. Decreased ADL/Functional Activities  3. Decreased Transfer Abilities  4. Increased Pain  5. Increased Fatigue  6. Decreased Flexibility/Joint Mobility  7. Decreased Knowledge of Precautions   INTERVENTIONS PLANNED: (Benefits and precautions of occupational therapy have been discussed with the patient.)  1. Activities of daily living training  2.  Adaptive equipment training  3. Balance training  4. Clothing management  5. Donning&doffing training  6. Theraputic activity     TREATMENT PLAN: Frequency/Duration: Follow patient 2-3 times to address above goals. Rehabilitation Potential For Stated Goals: Good     RECOMMENDED REHABILITATION/EQUIPMENT: (at time of discharge pending progress): Continue Skilled Therapy. OCCUPATIONAL PROFILE AND HISTORY:   History of Present Injury/Illness (Reason for Referral): Pt presents this date s/p (left) NATE. Past Medical History/Comorbidities:   Ms. Raghu Edwards  has a past medical history of Anxiety, Arthritis, Atrial fibrillation ([address] CAD (coronary artery disease), Cancer ([address] Constipation, Fibromyalgia, GERD (gastroesophageal reflux disease), Hypercholesteremia, Hypertension, Iron deficiency anemia, PAD (peripheral artery disease) ([address] Polyneuropathy, Rheumatoid arthritis ([address] Thyroid disease, Uterine cancer ([address] and Vulva neoplasm. She also has no past medical history of Autoimmune disease ([address] Other ill-defined conditions(799.89), or Unspecified adverse effect of anesthesia. Ms. Raghu Edwards  has a past surgical history that includes hx lap cholecystectomy; hx total abdominal hysterectomy; hx knee replacement (Bilateral); hx hip replacement (Right); hx cataract removal (Left); hx heart catheterization; hx parathyroidectomy; hx other surgical; and hx other surgical.  Social History/Living Environment:   Home Environment: Private residence  # Steps to Enter: 3  Wheelchair Ramp: Yes  One/Two Story Residence: One story  Living Alone: No  Support Systems: Child(clemente)  Patient Expects to be Discharged to[de-identified] Private residence  Current DME Used/Available at Home: Redgie Blocker, straight, Commode, bedside, Shower chair, Walker, rolling  Tub or Shower Type: Tub/Shower combination  Prior Level of Function/Work/Activity:  Assist with ADLs, CGA for mobility with rollator? W/c?      Number of Personal Factors/Comorbidities that affect the Plan of Care: Brief history (0):  LOW COMPLEXITY   ASSESSMENT OF OCCUPATIONAL PERFORMANCE[de-identified]   Most Recent Physical Functioning:   Balance  Sitting: High guard; With support  Standing: Pull to stand; With support       Gross Assessment  AROM: Generally decreased, functional(very limited all joints LE's)  Strength: Generally decreased, functional(very limited all joints LE's)          LLE AROM  L Hip Flexion: 75  L Hip ABduction: 5 Coordination  Fine Motor Skills-Upper: Left Impaired;Right Impaired(B hand deformities)         Mental Status  Neurologic State: Alert; Appropriate for age  Orientation Level: Appropriate for age  Cognition: Appropriate decision making; Appropriate for age attention/concentration; Appropriate safety awareness; Follows commands  Perception: Cues to maintain midline in standing  Perseveration: No perseveration noted  Safety/Judgement: Awareness of environment; Fall prevention                Basic ADLs (From Assessment) Complex ADLs (From Assessment)   Basic ADL  Feeding: Supervision(drinking her coffee)  Oral Facial Hygiene/Grooming: Maximum assistance  Bathing: Maximum assistance  Type of Bath: Chlorhexidine (CHG), Bath Pack  Upper Body Dressing: Maximum assistance  Lower Body Dressing: Total assistance  Toileting: Total assistance     Grooming/Bathing/Dressing Activities of Daily Living     Cognitive Retraining  Safety/Judgement: Awareness of environment; Fall prevention   Upper Body Bathing  Bathing Assistance: Maximum assistance  Position Performed: Seated in chair     Lower Body Bathing  Bathing Assistance: Total assistance(dependent)  Perineal  : Total assistance (dependent)  Position Performed: Standing  Lower Body : Total assistance (dependent)  Position Performed: Seated in chair Toileting  Toileting Assistance: Total assistance(dependent)  Bladder Hygiene: Total assistance (dependent)  Bowel Hygiene:  Total assistance (dependent)  Clothing Management: Total assistance (dependent)   Upper Body [address] Gown: Maximum assistance  Pullover Shirt: Maximum assistance; Total assistance (dependent)(pullover night gown) Functional Transfers  Toilet Transfer : Minimum assistance;Assist x2  Shower Transfer: Minimum assistance;Assist x2   Lower Body Dressing Assistance  Socks: Total assistance (dependent) Bed/Mat Mobility  Supine to Sit: Moderate assistance;Assist x2; Additional time  Sit to Supine: Moderate assistance;Assist x2  Sit to Stand: Minimum assistance;Assist x2  Stand to Sit: Minimum assistance;Assist x2  Bed to Chair: Minimum assistance;Assist x2  Scooting: Total assistance         Physical Skills Involved:  1. Range of Motion  2. Balance  3. Strength  4. Activity Tolerance Cognitive Skills Affected (resulting in the inability to perform in a timely and safe manner): 1. none Psychosocial Skills Affected:  1. Habits/Routines  2. Environmental Adaptation  3. Self-Awareness   Number of elements that affect the Plan of Care: 3-5:  MODERATE COMPLEXITY   CLINICAL DECISION MAKIN[address] [place] [id number] AM-PAC 6 Clicks   Daily Activity Inpatient Short Form  How much help from another person does the patient currently need. .. Total A Lot A Little None   1. Putting on and taking off regular lower body clothing? [x] 1   [] 2   [] 3   [] 4   2. Bathing (including washing, rinsing, drying)? [] 1   [x] 2   [] 3   [] 4   3. Toileting, which includes using toilet, bedpan or urinal?   [x] 1   [] 2   [] 3   [] 4   4. Putting on and taking off regular upper body clothing? [] 1   [x] 2   [] 3   [] 4   5. Taking care of personal grooming such as brushing teeth? [] 1   [x] 2   [] 3   [] 4   6. Eating meals? [] 1   [] 2   [x] 3   [] 4   © , Trustees of [facility] [address], under license to [employer].  All rights reserved     Score:  Initial:11 Most Recent: X (Date: -- )    Interpretation of Tool:  Represents activities that are increasingly more difficult (i.e. Bed mobility, Transfers, Gait).     Medical Necessity:     · Patient is expected to demonstrate progress in   · Self care skills and functional mobility  ·     Reason for Services/Other Comments:  · Patient continues to require skilled intervention due to   · Above listed deficits     Use of outcome tool(s) and clinical judgement create a POC that gives a: MODERATE COMPLEXITY            TREATMENT:   (In addition to Assessment/Re-Assessment sessions the following treatments were rendered)     Pre-treatment Symptoms/Complaints:    Pain: Initial:   Pain Intensity 1: 8  Pain Location 1: Hip  Pain Orientation 1: Left  Pain Intervention(s) 1: Ambulation/Increased Activity  Post Session:  5/10 rest     Self Care: (10): Procedure(s) (per grid) utilized to improve and/or restore self-care/home management as related to functional transfers. Required maximal visual, verbal, manual and tactile cueing to facilitate activities of daily living skills and compensatory activities. Treatment/Session Assessment:  Tolerated well, more steps this afternoon   Response to Treatment:  tolerated well, decreased standing balance in standing    Education:  [] Home Exercises  [x] Fall Precautions  [x] Hip Precautions [] Going Home Video  [] Knee/Hip Prosthesis Review  [x] Walker Management/Safety [x] Adaptive Equipment as Needed       Interdisciplinary Collaboration:   o Physical Therapist  o Occupational Therapist  o Registered Nurse    After treatment position/precautions:   o Up in chair  o Bed/Chair-wheels locked  o Bed in low position  o Caregiver at bedside  o Call light within reach  o RN notified  o Side rails x 3     Compliance with Program/Exercises: Compliant all of the time. Recommendations/Intent for next treatment session:  Treatment next visit will focus on increasing Ms. Contreras's independence with bed mobility, transfers, self care, functional mobility, modalities for pain, and patient education.       Total Treatment Duration:10  OT Patient Time In/Time Out  Time In: 1450  Time Out: [address] Miriam Wise, OT

## 2021-03-10 NOTE — PROGRESS NOTES
Problem: Mobility Impaired (Adult and Pediatric)  Goal: *Acute Goals and Plan of Care (Insert Text)  Outcome: Progressing Towards Goal  Note: GOALS (1-4 days):  (1.)Ms. Marisa Boyd will move from supine to sit and sit to supine  in bed with MINIMAL ASSIST.    (2.)Ms. Marisa Boyd will transfer from bed to chair and chair to bed with MINIMAL ASSIST using the least restrictive device. (3.)Ms. Marisa Boyd will ambulate with MINIMAL ASSIST for 10 feet with the least restrictive device. (4.)Ms. Contreras will ambulate up/down 3 steps with bilateral  railing with MODERATE ASSIST with no device. (5.)Ms. Contreras will state/observe NATE precautions with 0 verbal cues. ________________________________________________________________________________________________       PHYSICAL THERAPY JOINT CAMP NATE: Daily Note and PM 3/10/2021  INPATIENT: Hospital Day: 2  Payor: Racheal Arriaza / Plan: [facility] HUMANA MEDICARE CHOICE PPO/PFFS / Product Type: [employer] Care Medicare /      NAME/AGE/GENDER: Manjeet Otto is a 68 y.o. female   PRIMARY DIAGNOSIS:  Unilateral primary osteoarthritis, left hip [M16.12]   Procedure(s) and Anesthesia Type:     * LEFT HIP ARTHROPLASTY TOTAL/ DEPUY - Spinal (Left)  ICD-10: Treatment Diagnosis:    · Pain in left hip (M25.552)  · Stiffness of Left Hip, Not elsewhere classified (M25.652)  · Difficulty in walking, Not elsewhere classified (R26.2)      ASSESSMENT:     Ms. Marisa Boyd presents with significant decrease in functional mobility and gait as well as decreased rom and strength of left LE s/p left nate. She needed assistance to move prior to surgery and has signficant joint limitations from RA. All her joints are limited. She plans to go home with HHPT and family. Spoke with son who assured me there was lots of family to help at home as I told him she will need lots of help. Will follow. PM-pt. Up in chair and agreeable to move and wanting to get back in bed.   She moved a little better this afternoon but still takes extra time and assistance to move. She was able to take a few more steps with RW and assistance this afternoon. She needs constant cueing and guidance and assistance. She needs more assistance to lie down. She did some ricardo exercises in the bed with cues and assistance. No questions. At this time, patient is appropriate for Co-treatment with physical therapy due to patient's decreased overall endurance/tolerance levels, as well as need for high level skilled assistance to complete functional transfers/mobility and functional tasks. [place] is appropriate for a multidisciplinary co-treatment of PT and OT to address goals of both disciplines. This section established at most recent assessment   PROBLEM LIST (Impairments causing functional limitations):  1. Decreased Strength  2. Decreased ADL/Functional Activities  3. Decreased Transfer Abilities  4. Decreased Ambulation Ability/Technique  5. Decreased Balance  6. Increased Pain  7. Decreased Activity Tolerance  8. Decreased Flexibility/Joint Mobility  9. Decreased Comins with Home Exercise Program  10. Decreased strength   INTERVENTIONS PLANNED: (Benefits and precautions of physical therapy have been discussed with the patient.)  1. Bed Mobility  2. Cold  3. Gait Training  4. Home Exercise Program (HEP)  5. Range of Motion (ROM)  6. Therapeutic Activites  7. Therapeutic Exercise/Strengthening  8. Transfer Training     TREATMENT PLAN: Frequency/Duration: Follow patient BID for duration of hospital stay to address above goals. Rehabilitation Potential For Stated Goals: Fair     RECOMMENDED REHABILITATION/EQUIPMENT: (at time of discharge pending progress): Continue Skilled Therapy and Home Health: Physical Therapy.               HISTORY:   History of Present Injury/Illness (Reason for Referral):  S/p left ricardo  Past Medical History/Comorbidities:   Ms. Marisa Boyd  has a past medical history of Anxiety, Arthritis, Atrial fibrillation ([address] CAD (coronary artery disease), Cancer ([facility] Constipation, Fibromyalgia, GERD (gastroesophageal reflux disease), Hypercholesteremia, Hypertension, Iron deficiency anemia, PAD (peripheral artery disease) ([facility] Polyneuropathy, Rheumatoid arthritis ([facility] Thyroid disease, Uterine cancer ([facility] and Vulva neoplasm. She also has no past medical history of Autoimmune disease ([facility] Other ill-defined conditions(799.89), or Unspecified adverse effect of anesthesia. Ms. Elfida Mcardle  has a past surgical history that includes hx lap cholecystectomy; hx total abdominal hysterectomy; hx knee replacement (Bilateral); hx hip replacement (Right); hx cataract removal (Left); hx heart catheterization; hx parathyroidectomy; hx other surgical; and hx other surgical.   Social History/Living Environment:   Home Environment: Private residence  # Steps to Enter: 3  Rails to Enter: Yes  Hand Rails : Bilateral  Wheelchair Ramp: Yes  One/Two Story Residence: One story  Living Alone: No  Support Systems: Child(clemente)  Patient Expects to be Discharged to[de-identified] Private residence  Current DME Used/Available at Home: Cane, straight;Commode, bedside; Shower chair;Walker, rolling  Tub or Shower Type: Tub/Shower combination    Prior Level of Function/Work/Activity:  Assist with gait and adl's   Number of Personal Factors/Comorbidities that affect the Plan of Care: 3+: HIGH COMPLEXITY   EXAMINATION:   Most Recent Physical Functioning:      Gross Assessment  AROM: Generally decreased, functional(very limited all joints LE's)  Strength: Generally decreased, functional(very limited all joints LE's)          LLE AROM  L Hip Flexion: 75  L Hip ABduction: 5          Bed Mobility  Supine to Sit: Moderate assistance;Assist x2; Additional time  Sit to Supine: Moderate assistance;Assist x2; Additional time  Scooting: Total assistance    Transfers  Sit to Stand: Assist x2; Additional time;Minimum assistance  Stand to Sit: Minimum assistance; Additional time;Assist x2  Bed to Chair: Minimum assistance; Additional time;Assist x2    Balance  Sitting: With support;High guard  Standing: Pull to stand; With support              Weight Bearing Status  Left Side Weight Bearing: As tolerated  Distance (ft): 5 Feet (ft)  Ambulation - Level of Assistance: Minimal assistance;Assist x2; Additional time  Assistive Device: Walker, rolling  Speed/Shannon: Delayed  Step Length: Left shortened;Right shortened  Stance: Left decreased  Gait Abnormalities: Antalgic;Decreased step clearance  Interventions: Safety awareness training;Verbal cues     Braces/Orthotics:            Body Structures Involved:  1. Bones  2. Joints  3. Muscles  4. Ligaments Body Functions Affected:  1. Movement Related Activities and Participation Affected:  1. Mobility   Number of elements that affect the Plan of Care: 3: MODERATE COMPLEXITY   CLINICAL PRESENTATION:   Presentation: Stable and uncomplicated: LOW COMPLEXITY   CLINICAL DECISION MAKING:   [facility] AM-PAC 6 Clicks   Basic Mobility Inpatient Short Form  How much difficulty does the patient currently have. .. Unable A Lot A Little None   1. Turning over in bed (including adjusting bedclothes, sheets and blankets)? [] 1   [x] 2   [] 3   [] 4   2. Sitting down on and standing up from a chair with arms ( e.g., wheelchair, bedside commode, etc.)   [] 1   [x] 2   [] 3   [] 4   3. Moving from lying on back to sitting on the side of the bed? [] 1   [x] 2   [] 3   [] 4   How much help from another person does the patient currently need. .. Total A Lot A Little None   4. Moving to and from a bed to a chair (including a wheelchair)? [] 1   [x] 2   [] 3   [] 4   5. Need to walk in hospital room? [x] 1   [] 2   [] 3   [] 4   6. Climbing 3-5 steps with a railing? [x] 1   [] 2   [] 3   [] 4   © 2007, Trustees of [facility], under license to [employer].  All rights reserved     Score:  Initial: 10 Most Recent: X (Date: -- )    Interpretation of Tool:  Represents activities that are increasingly more difficult (i.e. Bed mobility, Transfers, Gait). Medical Necessity:     · Patient is expected to demonstrate progress in   · strength, range of motion, and balance  ·  to   · decrease assistance required with exercises and functional mobility  · . Reason for Services/Other Comments:  · Patient   · continues to require present interventions due to patient's inability to perform exercises and functional mobility independently  · . Use of outcome tool(s) and clinical judgement create a POC that gives a: Questionable prediction of patient's progress: MODERATE COMPLEXITY            TREATMENT:   (In addition to Assessment/Re-Assessment sessions the following treatments were rendered)     Pre-treatment Symptoms/Complaints:  hip pain  Pain Initial:      Post Session: 8   Gait Training (15 Minutes):  Gait training to improve and/or restore physical functioning as related to mobility, strength and balance. Ambulated 5 Feet (ft) with Minimal assistance;Assist x2; Additional time using a Walker, rolling and moderate Safety awareness training;Verbal cues related to their stance phase to promote proper body alignment, promote proper body posture and promote proper body mechanics. Therapeutic Activity: (  10 Minutes ):  Therapeutic activities including Bed transfers, Chair transfers, Ambulation on level ground and standing with rW to improve mobility, strength and balance. Required moderate Safety awareness training;Verbal cues to promote static and dynamic balance in standing. Therapeutic Exercise: (15 Minutes):  Exercises per grid below to improve mobility and strength. Required minimal visual, verbal, and manual cues to promote proper body alignment, promote proper body posture, and promote proper body mechanics. Progressed range and repetitions as indicated.      Date:  3/10 Date:   Date:     ACTIVITY/EXERCISE AM PM AM PM AM PM   GROUP THERAPY  []  []  []  []  []  []   Ankle Pumps 10a 15a       Quad Sets 10aa 15aa       Gluteal Sets 10a 15a       Hip ABd/ADduction 10aa 15aa       Straight Leg Raises         Knee Slides 10aa 15aa       Short Arc Quads  15aa       Long Arc Quads         Chair Slides                  B = bilateral; AA = active assistive; A = active; P = passive      Treatment/Session Assessment:     Response to Treatment:  Did little better, extra time needed, will be slower progress. Education:  [x] Home Exercises  [x] Fall Precautions  [x] Hip Precautions [] D/C Instruction Review  [x] Hip Prosthesis Review  [x] Walker Management/Safety [] Adaptive Equipment as Needed       Interdisciplinary Collaboration:   o Occupational Therapist  o Registered Nurse    After treatment position/precautions:   o Supine in bed  o Bed/Chair-wheels locked  o Bed in low position  o Call light within reach    Compliance with Program/Exercises: Will assess as treatment progresses. Recommendations/Intent for next treatment session:  Treatment next visit will focus on increasing Ms. Contreras's independence with bed mobility, transfers, gait training, strength/ROM exercises, modalities for pain, and patient education.       Total Treatment Duration:  PT Patient Time In/Time Out  Time In: 1450  Time Out: [address], PT

## 2021-03-10 NOTE — DISCHARGE INSTRUCTIONS
[facility]   Patient Discharge Instructions    Sarah Harper / [id number] : 1945    Admitted 3/9/2021 Discharged: 3/10/2021     IF YOU HAVE ANY PROBLEMS ONCE YOU ARE AT HOME CALL THE FOLLOWING NUMBERS:   Main office number: [phone]    Take Home Medications         · It is important that you take the medication exactly as they are prescribed. · Keep your medication in the bottles provided by the pharmacist and keep a list of the medication names, dosages, and times to be taken in your wallet. · Do not take other medications without consulting your doctor. What to do at [address] your prehospital diet. If you have excessive nausea or vomitting call your doctor's office     Home Physical Therapy is arranged. Use rolling walker when walking. Patients who have had a joint replacement should not drive until you are seen for your follow up appointment by Dr. Jorge Luis Chaney. When to Call    - Call if you have a temperature greater then 101  - Unable to keep food down  - Loose control of your bladder or bowel function  - Are unable to bear any weight   - Need a pain medication refill     Patient Education        Hip Replacement Surgery (Posterior): What to Expect at Home  Your Recovery  Hip replacement surgery replaces the worn parts of your hip joint. When you leave the hospital, you will probably be walking with crutches or a walker. You may be able to climb a few stairs and get in and out of bed and chairs. But you will need someone to help you at home for the next few weeks or until you have more energy and can move around better. If you need more extensive rehab, you may go to a specialized rehab center for more treatment. You will go home with a bandage and stitches, staples, tissue glue, or tape strips. You can remove the bandage when your doctor tells you to. If you have stitches or staples, your doctor will remove them 10 days to 3 weeks after your surgery.  Glue or tape strips will fall off on their own over time. You may still have some mild pain, and the area may be swollen for 3 to 4 months after surgery. Your doctor will give you medicine for the pain. You will continue the rehabilitation program (rehab) you started in the hospital. The better you do with your rehab exercises, the sooner you will get your strength and movement back. Most people are able to return to work 4 weeks to 4 months after surgery. This care sheet gives you a general idea about how long it will take for you to recover. But each person recovers at a different pace. Follow the steps below to get better as quickly as possible. How can you care for yourself at home? Activity    · Your doctor may not want your affected leg to cross the center of your body toward the other leg. If so, your therapist may suggest these ideas:  ? Do not cross your legs. ? Be very careful as you get in or out of bed or a car, so your leg does not cross that imaginary line in the middle of your body.     · Go slowly when you climb stairs. Make sure the lights are on. Have someone watch you, if you can. When you climb stairs:  ? Step up first with your unaffected leg. Then bring the affected leg up to the same step. Bring your crutches or cane up. ? To go down stairs, reverse the order. First, put your crutches or cane on the lower step. Then bring the affected leg down to that step. Finally, step down with the unaffected leg.     · You can ride in a car, but stop at least once every hour to get out and walk around.     · You may want to sleep on your back. Don't reach down too far to pull up blankets when you lie in bed.     · If your doctor recommends exercises, do them as directed. You can cut back on your exercises if your muscles start to ache, but don't stop doing them.     · Rest when you feel tired.  You may take a nap, but don't stay in bed all day.     · Work with your physical therapist to learn the best way to exercise. You will probably have to use crutches or a walker for at least 4 to 6 weeks.     · Your doctor may advise you to stay away from activities that put stress on the joint. This includes sports such as tennis, football, and jogging.     · Try not to sit for too long at one time. You will feel less stiff if you take a short walk about every hour. When you sit, use chairs with arms, and don't sit in low chairs.     · Do not bend over more than 90 degrees (like the angle in a letter \"L\").     · Sleep on your back with your legs slightly apart or on your side with a pillow between your knees for about 6 weeks or as your doctor tells you. Do not sleep on your stomach or affected leg.     · Ask your doctor when you can drive again.     · Most people are able to return to work 4 weeks to 4 months after surgery.     · Ask your doctor when it is okay for you to have sex. Diet    · By the time you leave the hospital, you will probably be eating your normal diet. If your stomach is upset, try bland, low-fat foods like plain rice, broiled chicken, toast, and yogurt. Your doctor may recommend that you take iron and vitamin supplements.     · Drink plenty of fluids (unless your doctor tells you not to).   · Eat healthy foods, and watch your portion sizes. Try to stay at your ideal weight. Too much weight puts more stress on your new hip joint.     · You may notice that your bowel movements are not regular right after your surgery. This is common. Try to avoid constipation and straining with bowel movements. You may want to take a fiber supplement every day. If you have not had a bowel movement after a couple of days, ask your doctor about taking a mild laxative. Medicines    · Your doctor will tell you if and when you can restart your medicines.  He or she will also give you instructions about taking any new medicines.     · If you take aspirin or some other blood thinner, ask your doctor if and when to start taking it again. Make sure that you understand exactly what your doctor wants you to do.     · Your doctor may give you a blood-thinning medicine to prevent blood clots. If you take a blood thinner, be sure you get instructions about how to take your medicine safely. Blood thinners can cause serious bleeding problems. This medicine could be in pill form or as a shot (injection). If a shot is necessary, your doctor will tell you how to do this.     · Be safe with medicines. Take pain medicines exactly as directed. ? If the doctor gave you a prescription medicine for pain, take it as prescribed. ? If you are not taking a prescription pain medicine, ask your doctor if you can take an over-the-counter medicine.     · If you think your pain medicine is making you sick to your stomach:  ? Take your medicine after meals (unless your doctor has told you not to). ? Ask your doctor for a different pain medicine.     · If your doctor prescribed antibiotics, take them as directed. Do not stop taking them just because you feel better. You need to take the full course of antibiotics. Incision care    · If your doctor told you how to care for your cut (incision), follow your doctor's instructions. You will have a dressing over the cut. A dressing helps the incision heal and protects it. Your doctor will tell you how to take care of this.     · If you did not get instructions, follow this general advice:  ? If you have strips of tape on the cut the doctor made, leave the tape on for a week or until it falls off.  ? If you have stitches or staples, your doctor will tell you when to come back to have them removed. ? If you have skin adhesive on the cut, leave it on until it falls off. Skin adhesive is also called glue or liquid stitches. ? Change the bandage every day. ? Wash the area daily with warm water, and pat it dry. Don't use hydrogen peroxide or alcohol. They can slow healing.   ? You may cover the area with a gauze bandage if it oozes fluid or rubs against clothing.  ? You may shower 24 to 48 hours after surgery. Pat the incision dry. Don't swim or take a bath for the first 2 weeks, or until your doctor tells you it is okay.   Exercise    · Your rehab program will include a number of exercises to do. Always do them as your therapist tells you.   Ice and elevation    · For pain, put ice or a cold pack on the area for 10 to 20 minutes at a time. Put a thin cloth between the ice and your skin.     · Your ankle may swell for about 3 months. Prop up your ankle when you ice it or anytime you sit or lie down. Try to keep it above the level of your heart. This will help reduce swelling.   Other instructions    · Continue to wear your support stockings as your doctor says. These help to prevent blood clots. How long you'll have to wear them depends on your activity level and the amount of swelling you have. Most people wear these stockings for 4 to 6 weeks after surgery.     · Try to prevent falls. To avoid falling:  ? Arrange furniture so that you will not trip on it.  ? Get rid of throw rugs, and move electrical cords out of the way.  ? Walk only in areas with plenty of light.  ? Put grab bars in showers and bathtubs.  ? Avoid icy or snowy sidewalks.  ? Wear shoes with sturdy, flat soles.   Follow-up care is a key part of your treatment and safety. Be sure to make and go to all appointments, and call your doctor if you are having problems. It's also a good idea to know your test results and keep a list of the medicines you take.  When should you call for help?   Call 911 anytime you think you may need emergency care. For example, call if:    · You passed out (lost consciousness).     · You have severe trouble breathing.     · You have sudden chest pain and shortness of breath, or you cough up blood.   Call your doctor now or seek immediate medical care if:    · You have signs that your hip may be dislocated, including:  ? Severe pain and not  being able to stand. ? A crooked leg that looks like your hip is out of position. ? Not being able to bend or straighten your leg.     · Your leg or foot is cool or pale or changes color.     · You cannot feel or move your leg.     · You have signs of a blood clot, such as:  ? Pain in your calf, back of the knee, thigh, or groin. ? Redness and swelling in your leg or groin.     · Your incision comes open and begins to bleed, or the bleeding increases.     · You feel like your heart is racing or beating irregularly.     · You have signs of infection, such as:  ? Increased pain, swelling, warmth, or redness. ? Red streaks leading from the incision. ? Pus draining from the incision. ? A fever. Watch closely for changes in your health, and be sure to contact your doctor if:    · You do not have a bowel movement after taking a laxative.     · You do not get better as expected. Where can you learn more? Go to http://www.gray.com/  Enter Q746 in the search box to learn more about \"Hip Replacement Surgery (Posterior): What to Expect at Home. \"  Current as of: March 2, 2020               Content Version: 12.6  © 1864-4923 [employer]. Care instructions adapted under license by [employer] (which disclaims liability or warranty for this information). If you have questions about a medical condition or this instruction, always ask your healthcare professional. [place] [address] any warranty or liability for your use of this information. Information obtained by :  I understand that if any problems occur once I am at home I am to contact my physician. I understand and acknowledge receipt of the instructions indicated above.                                                                                                                                            Physician's or R.N.'s Signature Date/Time                                                                                                                                              Patient or Representative Signature                                                          Date/Time

## 2021-03-11 VITALS
DIASTOLIC BLOOD PRESSURE: 62 MMHG | RESPIRATION RATE: 16 BRPM | WEIGHT: 140 LBS | OXYGEN SATURATION: 98 % | HEIGHT: 70 IN | BODY MASS INDEX: 20.04 KG/M2 | SYSTOLIC BLOOD PRESSURE: 97 MMHG | TEMPERATURE: 98.2 F | HEART RATE: 76 BPM

## 2021-03-11 PROCEDURE — 97530 THERAPEUTIC ACTIVITIES: CPT

## 2021-03-11 PROCEDURE — 97535 SELF CARE MNGMENT TRAINING: CPT

## 2021-03-11 PROCEDURE — 74011250637 HC RX REV CODE- 250/637: Performed by: PHYSICIAN ASSISTANT

## 2021-03-11 PROCEDURE — 97116 GAIT TRAINING THERAPY: CPT

## 2021-03-11 PROCEDURE — 97110 THERAPEUTIC EXERCISES: CPT

## 2021-03-11 RX ADMIN — Medication 1 AMPULE: at 08:22

## 2021-03-11 RX ADMIN — HYDROCODONE BITARTRATE AND ACETAMINOPHEN 1 TABLET: 10; 325 TABLET ORAL at 08:22

## 2021-03-11 RX ADMIN — LEVOTHYROXINE SODIUM 137 MCG: 0.11 TABLET ORAL at 05:34

## 2021-03-11 RX ADMIN — HYDROCODONE BITARTRATE AND ACETAMINOPHEN 1 TABLET: 10; 325 TABLET ORAL at 13:05

## 2021-03-11 RX ADMIN — Medication 10 ML: at 05:34

## 2021-03-11 RX ADMIN — CELECOXIB 200 MG: 200 CAPSULE ORAL at 05:34

## 2021-03-11 RX ADMIN — PANTOPRAZOLE SODIUM 40 MG: 40 TABLET, DELAYED RELEASE ORAL at 06:22

## 2021-03-11 RX ADMIN — Medication 81 MG: at 08:24

## 2021-03-11 RX ADMIN — DOCUSATE SODIUM 50MG AND SENNOSIDES 8.6MG 2 TABLET: 8.6; 5 TABLET, FILM COATED ORAL at 08:23

## 2021-03-11 RX ADMIN — FERROUS SULFATE TAB 325 MG (65 MG ELEMENTAL FE) 325 MG: 325 (65 FE) TAB at 08:23

## 2021-03-11 RX ADMIN — GABAPENTIN 100 MG: 100 CAPSULE ORAL at 08:22

## 2021-03-11 NOTE — PROGRESS NOTES
I have reviewed discharge instructions with the patient and daughter. The patient and daughter verbalized understanding. Prescriptions sent to pharmacy by MD. [facility] to follow.

## 2021-03-11 NOTE — PROGRESS NOTES
Problem: Self Care Deficits Care Plan (Adult)  Goal: *Acute Goals and Plan of Care (Insert Text)  Description: GOALS:   DISCHARGE GOALS (in preparation for going home/rehab):  3 days  1. Ms. Shante Garcia will perform upper body dressing activity with max assistance. GOAL MET 3/11/2021  2. Ms. Shante Garcia will perform one upper body bathing with max body bathing activity with max assistance. PROGRESSING'  3.  Ms. Shante Garcia will perform  toilet transfer with mod assist x 1 to min assist with adaptive equipment to demonstrate improved functional mobility and safety. GOAL MET 3/11/2021  4. Ms. Shante Garcia will perform  Standing with RW with CGA for bathing, dressing and toileting using RW for balance. GOAL MET 3/11/2021           Outcome: Progressing Towards Goal       JOINT CAMP OCCUPATIONAL THERAPY NATE: Daily Note, Treatment Day: 4th and AM    3/11/2021  INPATIENT: Hospital Day: 3  Payor: Olayinka Rangel / Plan: [email or web address] MEDICARE CHOICE PPO/PFFS / Product Type: Managed Care Medicare /      NAME/AGE/GENDER: Khushboo Fuentes is a 68 y.o. female   PRIMARY DIAGNOSIS:  Unilateral primary osteoarthritis, left hip [M16.12]   Procedure(s) and Anesthesia Type:     * LEFT HIP ARTHROPLASTY TOTAL/ DEPUY - Spinal (Left)  ICD-10: Treatment Diagnosis:    · Pain in left hip (M25.552)  · Stiffness of Left Hip, Not elsewhere classified (M25.652)  · Generalized Muscle Weakness (M62.81)  · Other lack of cordination (R27.8)  · Difficulty in walking, Not elsewhere classified (R26.2)  · Other abnormalities of gait and mobility (R26.89)      ASSESSMENT:     Ms. Shante Garcia is s/p left NATE and presents with decreased weight bearing on left LE and decreased independence with functional mobility and activities of daily living as compared to prior level of function and safety. Patient would benefit from skilled Occupational Therapy to maximize independence and safety with self-care task and functional mobility.   Pt would also benefit from education on lower body adaptive equipment and hip precautions post-surgery in preparation for going home. Patient plans for further rehab at home with home health services and good family support. OT reviewed therapy schedule and plan of care with patient. Patient was able to transfer and perform self care skills as charted below. Patient instructed to call for assistance when needing to get up from the recliner and all needs in reach. Patient verbalized understanding of call light. Patient mod assist x 2 supine to sit. She stood and took steps to recliner with min x 2. She has B hand deformities. Her son in present. OT to return later this am for ADL session. She is working with  PT.    3/10/21 1140 Juvenal reported her daughter would sponge bath her while she stood at the sink at home. Her daughter would assist her with all toileting as well. Ms. Brent Sanchez completed sponge bath from recliner and stood with min x 2 for washing isacc areas and donning clean brief. She returned to recliner and donned night gown. She plans to return home with  Daughter and family to assist. Recommend  OT/PT/ aide. She would continue to benefit from skilled OT. Will follow. 3/10/21 1450 Co treat with PT to transfer from recliner to bed with min x 2. She took more steps then earlier this am. She was able to side step to hob with  Min x 2. She sat and then was mod x 2 sit to supine. She is planning on discharging home with family to assist her. She would continue to benefit from skilled OT . Will continue with OT POC.    3/11/21 730am Patient min x 2 supine to sit. She stood and transferred to [facility] with min assist. She toileted and bathed with max to total assist. She donned clean PJ and stood with min assist, CGA to stand to complete clothing management. She  Ambulate to the edge of bed with CGA. She is sitting up in recliner with all needs in reach. She plans to discharge home with daughter to assist her.  Recommend home health OT /PT and [facility] aide to assist. She is making progress with goals meeting 3/4 goals and would continue to benefit from skilled  OT. This section established at most recent assessment   PROBLEM LIST (Impairments causing functional limitations):  1. Decreased Strength  2. Decreased ADL/Functional Activities  3. Decreased Transfer Abilities  4. Increased Pain  5. Increased Fatigue  6. Decreased Flexibility/Joint Mobility  7. Decreased Knowledge of Precautions   INTERVENTIONS PLANNED: (Benefits and precautions of occupational therapy have been discussed with the patient.)  1. Activities of daily living training  2. Adaptive equipment training  3. Balance training  4. Clothing management  5. Donning&doffing training  6. Theraputic activity     TREATMENT PLAN: Frequency/Duration: Follow patient 2-3 times to address above goals. Rehabilitation Potential For Stated Goals: Good     RECOMMENDED REHABILITATION/EQUIPMENT: (at time of discharge pending progress): Continue Skilled Therapy. OCCUPATIONAL PROFILE AND HISTORY:   History of Present Injury/Illness (Reason for Referral): Pt presents this date s/p (left) NATE. Past Medical History/Comorbidities:   Ms. Janes Tamez  has a past medical history of Anxiety, Arthritis, Atrial fibrillation ([address] CAD (coronary artery disease), Cancer ([address] Constipation, Fibromyalgia, GERD (gastroesophageal reflux disease), Hypercholesteremia, Hypertension, Iron deficiency anemia, PAD (peripheral artery disease) ([address] Polyneuropathy, Rheumatoid arthritis ([address] Thyroid disease, Uterine cancer ([address] and Vulva neoplasm. She also has no past medical history of Autoimmune disease ([address] Other ill-defined conditions(799.89), or Unspecified adverse effect of anesthesia.   Ms. Janes Tamez  has a past surgical history that includes hx lap cholecystectomy; hx total abdominal hysterectomy; hx knee replacement (Bilateral); hx hip replacement (Right); hx cataract removal (Left); hx heart catheterization; hx parathyroidectomy; hx other surgical; and hx other surgical.  Social History/Living Environment:   Home Environment: Private residence  # Steps to Enter: 3  Wheelchair Ramp: Yes  One/Two Story Residence: One story  Living Alone: No  Support Systems: Child(clemente)  Patient Expects to be Discharged to[de-identified] Private residence  Current DME Used/Available at Home: Roman Rebolledo, straight, Commode, bedside, Shower chair, Walker, rolling  Tub or Shower Type: Tub/Shower combination  Prior Level of Function/Work/Activity:  Assist with ADLs, CGA for mobility with rollator? W/c? Number of Personal Factors/Comorbidities that affect the Plan of Care: Brief history (0):  LOW COMPLEXITY   ASSESSMENT OF OCCUPATIONAL PERFORMANCE[de-identified]   Most Recent Physical Functioning:   Balance  Sitting: Intact; High guard  Standing: Pull to stand; With support                              Mental Status  Neurologic State: Alert; Appropriate for age  Orientation Level: Appropriate for age  Cognition: Appropriate decision making; Appropriate for age attention/concentration; Appropriate safety awareness; Follows commands  Perception: Appears intact  Perseveration: No perseveration noted  Safety/Judgement: Awareness of environment; Fall prevention                Basic ADLs (From Assessment) Complex ADLs (From Assessment)   Basic ADL  Feeding: Supervision(drinking her coffee)  Oral Facial Hygiene/Grooming: Maximum assistance  Bathing: Maximum assistance  Type of Bath: Chlorhexidine (CHG), Bath Pack  Upper Body Dressing: Maximum assistance  Lower Body Dressing: Total assistance  Toileting: Total assistance     Grooming/Bathing/Dressing Activities of Daily Living     Cognitive Retraining  Safety/Judgement: Awareness of environment; Fall prevention   Upper Body Bathing  Bathing Assistance: Maximum assistance  Position Performed: Seated in chair     Lower Body Bathing  Bathing Assistance:  Total assistance(dependent)  Perineal  : Total assistance (dependent)  Position Performed: Standing  Lower Body : Total assistance (dependent) Toileting  Toileting Assistance: Maximum assistance; Total assistance(dependent)  Bladder Hygiene: Total assistance (dependent)  Bowel Hygiene: Total assistance (dependent)  Clothing Management: Maximum assistance   Upper Body Dressing Assistance  Dressing Assistance: Moderate assistance;Maximum assistance  Pullover Shirt: Moderate assistance;Maximum assistance Functional Transfers  Bathroom Mobility: Minimum assistance  Toilet Transfer : Minimum assistance   Lower Body Dressing Assistance  Dressing Assistance: Maximum assistance; Total assistance(dependent)  Protective Undergarmet: Total assistance (dependent)  Pants With Elastic Waist: Maximum assistance; Total assistance(dependent) Bed/Mat Mobility  Supine to Sit: Minimum assistance;Assist x2  Sit to Stand: Minimum assistance  Stand to Sit: Minimum assistance  Bed to Chair: Minimum assistance         Physical Skills Involved:  1. Range of Motion  2. Balance  3. Strength  4. Activity Tolerance Cognitive Skills Affected (resulting in the inability to perform in a timely and safe manner): 1. none Psychosocial Skills Affected:  1. Habits/Routines  2. Environmental Adaptation  3. Self-Awareness   Number of elements that affect the Plan of Care: 3-5:  MODERATE COMPLEXITY   CLINICAL DECISION MAKIN [facility] [address] [id number] AM-PAC 6 Clicks   Daily Activity Inpatient Short Form  How much help from another person does the patient currently need. .. Total A Lot A Little None   1. Putting on and taking off regular lower body clothing? [x] 1   [] 2   [] 3   [] 4   2. Bathing (including washing, rinsing, drying)? [] 1   [x] 2   [] 3   [] 4   3. Toileting, which includes using toilet, bedpan or urinal?   [x] 1   [] 2   [] 3   [] 4   4. Putting on and taking off regular upper body clothing? [] 1   [x] 2   [] 3   [] 4   5. Taking care of personal grooming such as brushing teeth? [] 1   [x] 2   [] 3   [] 4   6. Eating meals?    [] 1   [] 2 [x] 3   [] 4   © 2007, Trustees of [address] [place] [address] [address] [address], under license to [employer]. All rights reserved     Score:  Initial:11 Most Recent: X (Date: -- )    Interpretation of Tool:  Represents activities that are increasingly more difficult (i.e. Bed mobility, Transfers, Gait). Medical Necessity:     · Patient is expected to demonstrate progress in   · Self care skills and functional mobility  ·     Reason for Services/Other Comments:  · Patient continues to require skilled intervention due to   · Above listed deficits     Use of outcome tool(s) and clinical judgement create a POC that gives a: MODERATE COMPLEXITY            TREATMENT:   (In addition to Assessment/Re-Assessment sessions the following treatments were rendered)     Pre-treatment Symptoms/Complaints:    Pain: Initial:   Pain Intensity 1: 2  Pain Location 1: Hip  Pain Orientation 1: Left  Pain Intervention(s) 1: Ambulation/Increased Activity  Post Session:  4/10 rest     Self Care: (30): Procedure(s) (per grid) utilized to improve and/or restore self-care/home management as related to dressing, bathing, toileting and functional transfers. Required maximal visual, verbal, manual and tactile cueing to facilitate activities of daily living skills and compensatory activities. Treatment/Session Assessment:  Tolerated well, moving better today   Response to Treatment:  tolerated well, improved balance in standing    Education:  [] Home Exercises  [x] Fall Precautions  [x] Hip Precautions [] Going Home Video  [] Knee/Hip Prosthesis Review  [x] Walker Management/Safety [x] Adaptive Equipment as Needed       Interdisciplinary Collaboration:   o Physical Therapist  o Occupational Therapist  o Registered Nurse    After treatment position/precautions:   o Up in chair  o Bed/Chair-wheels locked  o Bed in low position  o Call light within reach  o RN notified     Compliance with Program/Exercises: Compliant all of the time.     Recommendations/Intent for next treatment session:  Treatment next visit will focus on increasing Ms. Contreras's independence with bed mobility, transfers, self care, functional mobility, modalities for pain, and patient education.       Total Treatment Duration:30  OT Patient Time In/Time Out  Time In: 0730  Time Out: 0800     Tanner Palmer OT

## 2021-03-11 NOTE — PROGRESS NOTES
Problem: Mobility Impaired (Adult and Pediatric)  Goal: *Acute Goals and Plan of Care (Insert Text)  Outcome: Progressing Towards Goal  Note: GOALS (1-4 days):  (1.)Ms. Evert Sanz will move from supine to sit and sit to supine  in bed with MINIMAL ASSIST.    (2.)Ms. Evert Sanz will transfer from bed to chair and chair to bed with MINIMAL ASSIST using the least restrictive device. (3.)Ms. Evert Sanz will ambulate with MINIMAL ASSIST for 10 feet with the least restrictive device. (4.)Ms. Contreras will ambulate up/down 3 steps with bilateral  railing with MODERATE ASSIST with no device. (5.)Ms. Contreras will state/observe RICARDO precautions with 0 verbal cues. ________________________________________________________________________________________________       PHYSICAL THERAPY JOINT CAMP RICARDO: Daily Note and PM 3/11/2021  INPATIENT: Hospital Day: 3  Payor: [employer] / Plan: [facility] HUMAN MEDICARE CHOICE PPO/PFFS / Product Type: [employer] Medicare /      NAME/AGE/GENDER: Eliz Francis is a 68 y.o. female   PRIMARY DIAGNOSIS:  Unilateral primary osteoarthritis, left hip [M16.12]   Procedure(s) and Anesthesia Type:     * LEFT HIP ARTHROPLASTY TOTAL/ DEPUY - Spinal (Left)  ICD-10: Treatment Diagnosis:    · Pain in left hip (M25.552)  · Stiffness of Left Hip, Not elsewhere classified (M25.652)  · Difficulty in walking, Not elsewhere classified (R26.2)      ASSESSMENT:     Ms. Evert Sanz presents with significant decrease in functional mobility and gait as well as decreased rom and strength of left LE s/p left ricardo. She needed assistance to move prior to surgery and has signficant joint limitations from RA. All her joints are limited. She plans to go home with HHPT and family. Spoke with son who assured me there was lots of family to help at home as I told him she will need lots of help. Will follow. Pt. Up in chair and reports doing ok this am.  She did ricardo exercises with cues and assistance and extra time needed to perform. She still needs a lot of help with the exercises but moved her left LE a little better. She still needs significant assistance and extra time to position and to stand. Her knee flexion is very limited and she doesn't want to flex her hips much to stand so she has a lot of difficulty sit to stand. She needs less assistance once standing but is very unsteady on her feet. She took a few steps with RW and did move a little better this am but again takes extra time and she was unable to go far due to pain and fatigue. She needs assistance sitting down as well. I spoke with SW to make sure they are aware that she needs full care at home. She and I agree she will not be able to climb any steps due to debility to get in house but she did not seem worried. Will see this pm.  She had no questions. This section established at most recent assessment   PROBLEM LIST (Impairments causing functional limitations):  1. Decreased Strength  2. Decreased ADL/Functional Activities  3. Decreased Transfer Abilities  4. Decreased Ambulation Ability/Technique  5. Decreased Balance  6. Increased Pain  7. Decreased Activity Tolerance  8. Decreased Flexibility/Joint Mobility  9. Decreased [place] with Home Exercise Program  10. Decreased strength   INTERVENTIONS PLANNED: (Benefits and precautions of physical therapy have been discussed with the patient.)  1. Bed Mobility  2. Cold  3. Gait Training  4. Home Exercise Program (HEP)  5. Range of Motion (ROM)  6. Therapeutic Activites  7. Therapeutic Exercise/Strengthening  8. Transfer Training     TREATMENT PLAN: Frequency/Duration: Follow patient BID for duration of hospital stay to address above goals. Rehabilitation Potential For Stated Goals: Fair     RECOMMENDED REHABILITATION/EQUIPMENT: (at time of discharge pending progress): Continue Skilled Therapy and Home Health: Physical Therapy.               HISTORY:   History of Present Injury/Illness (Reason for Referral):  S/p left ricardo  Past Medical History/Comorbidities:   Ms. Zoya Elliott  has a past medical history of Anxiety, Arthritis, Atrial fibrillation ([address] CAD (coronary artery disease), Cancer ([address] Constipation, Fibromyalgia, GERD (gastroesophageal reflux disease), Hypercholesteremia, Hypertension, Iron deficiency anemia, PAD (peripheral artery disease) ([address] Polyneuropathy, Rheumatoid arthritis ([address] Thyroid disease, Uterine cancer ([address] and Vulva neoplasm. She also has no past medical history of Autoimmune disease ([address] Other ill-defined conditions(799.89), or Unspecified adverse effect of anesthesia. Ms. Zoya Elliott  has a past surgical history that includes hx lap cholecystectomy; hx total abdominal hysterectomy; hx knee replacement (Bilateral); hx hip replacement (Right); hx cataract removal (Left); hx heart catheterization; hx parathyroidectomy; hx other surgical; and hx other surgical.   Social History/Living Environment:   Home Environment: Private residence  # Steps to Enter: 3  Rails to Enter: Yes  Hand Rails : Bilateral  Wheelchair Ramp: Yes  One/Two Story Residence: One story  Living Alone: No  Support Systems: Child(clemente)  Patient Expects to be Discharged to[de-identified] Private residence  Current DME Used/Available at Home: Cane, straight;Commode, bedside; Shower chair;Walker, rolling  Tub or Shower Type: Tub/Shower combination    Prior Level of Function/Work/Activity:  Assist with gait and adl's   Number of Personal Factors/Comorbidities that affect the Plan of Care: 3+: HIGH COMPLEXITY   EXAMINATION:   Most Recent Physical Functioning:                 LLE AROM  L Hip Flexion: 75  L Hip ABduction: 5          Bed Mobility  Supine to Sit: Minimum assistance;Assist x2    Transfers  Sit to Stand: Moderate assistance;Assist x2; Additional time  Stand to Sit: Moderate assistance;Assist x2; Additional time  Bed to Chair: Minimum assistance    Balance  Sitting: High guard  Standing: Pull to stand; With support; Impaired  Standing - Static: Constant support  Standing - Dynamic : Constant support              Weight Bearing Status  Left Side Weight Bearing: As tolerated  Distance (ft): 6 Feet (ft)  Ambulation - Level of Assistance: Minimal assistance  Assistive Device: Walker, rolling  Speed/Shannon: Delayed  Step Length: Left shortened;Right shortened  Stance: Left decreased  Gait Abnormalities: Antalgic;Decreased step clearance  Interventions: Safety awareness training;Verbal cues     Braces/Orthotics:     Left Hip Cold  Type: Cold/ice pack      Body Structures Involved:  1. Bones  2. Joints  3. Muscles  4. Ligaments Body Functions Affected:  1. Movement Related Activities and Participation Affected:  1. Mobility   Number of elements that affect the Plan of Care: 3: MODERATE COMPLEXITY   CLINICAL PRESENTATION:   Presentation: Stable and uncomplicated: LOW COMPLEXITY   CLINICAL DECISION MAKIN[address] [place] [id number] AM-PAC 6 Clicks   Basic Mobility Inpatient Short Form  How much difficulty does the patient currently have. .. Unable A Lot A Little None   1. Turning over in bed (including adjusting bedclothes, sheets and blankets)? [] 1   [x] 2   [] 3   [] 4   2. Sitting down on and standing up from a chair with arms ( e.g., wheelchair, bedside commode, etc.)   [] 1   [x] 2   [] 3   [] 4   3. Moving from lying on back to sitting on the side of the bed? [] 1   [x] 2   [] 3   [] 4   How much help from another person does the patient currently need. .. Total A Lot A Little None   4. Moving to and from a bed to a chair (including a wheelchair)? [] 1   [x] 2   [] 3   [] 4   5. Need to walk in hospital room? [x] 1   [] 2   [] 3   [] 4   6. Climbing 3-5 steps with a railing? [x] 1   [] 2   [] 3   [] 4   © , Trustees of [address] [place] [address], under license to [employer].  All rights reserved     Score:  Initial: 10 Most Recent: X (Date: -- )    Interpretation of Tool:  Represents activities that are increasingly more difficult (i.e. Bed mobility, Transfers, Gait). Medical Necessity:     · Patient is expected to demonstrate progress in   · strength, range of motion, and balance  ·  to   · decrease assistance required with exercises and functional mobility  · . Reason for Services/Other Comments:  · Patient   · continues to require present interventions due to patient's inability to perform exercises and functional mobility independently  · . Use of outcome tool(s) and clinical judgement create a POC that gives a: Questionable prediction of patient's progress: MODERATE COMPLEXITY            TREATMENT:   (In addition to Assessment/Re-Assessment sessions the following treatments were rendered)     Pre-treatment Symptoms/Complaints:  hip pain  Pain Initial:      Post Session: did not rate   Gait Training (15 Minutes):  Gait training to improve and/or restore physical functioning as related to mobility, strength and balance. Ambulated 6 Feet (ft) with Minimal assistance using a Walker, rolling and moderate Safety awareness training;Verbal cues related to their stance phase to promote proper body alignment, promote proper body posture and promote proper body mechanics. Therapeutic Activity: (  10 Minutes ):  Therapeutic activities including, Chair transfers, Ambulation on level ground and standing with rW to improve mobility, strength and balance. Required moderate Safety awareness training;Verbal cues to promote static and dynamic balance in standing. Therapeutic Exercise: (15 Minutes):  Exercises per grid below to improve mobility and strength. Required minimal visual, verbal, and manual cues to promote proper body alignment, promote proper body posture, and promote proper body mechanics. Progressed range and repetitions as indicated.      Date:  3/10 Date:  3/11 Date:     ACTIVITY/EXERCISE AM PM AM PM AM PM   GROUP THERAPY  []  []  []  []  []  []   Ankle Pumps 10a 15a 15a      Quad Sets 10aa 15aa 15a      Gluteal Sets 10a 15a 15a      Hip ABd/ADduction 10aa 15aa 15aa      Straight Leg Raises         Knee Slides 10aa 15aa 15aa      Short Arc Quads  15aa 15aa      Long Arc Quads   15aa      Chair Slides                  B = bilateral; AA = active assistive; A = active; P = passive      Treatment/Session Assessment:     Response to Treatment:  Little better but very slow progress, still needs a lot of assistance. Education:  [x] Home Exercises  [x] Fall Precautions  [x] Hip Precautions [] D/C Instruction Review  [x] Hip Prosthesis Review  [x] Walker Management/Safety [] Adaptive Equipment as Needed       Interdisciplinary Collaboration:   o Registered Nurse  o Certified Nursing Assistant/Patient Care Technician    After treatment position/precautions:   o Up in chair  o Bed/Chair-wheels locked  o Bed in low position  o Call light within reach    Compliance with Program/Exercises: Will assess as treatment progresses. Recommendations/Intent for next treatment session:  Treatment next visit will focus on increasing Ms. Contreras's independence with bed mobility, transfers, gait training, strength/ROM exercises, modalities for pain, and patient education.       Total Treatment Duration:  PT Patient Time In/Time Out  Time In: 1010  Time Out: BING Rubi

## 2021-03-11 NOTE — PROGRESS NOTES
Patient received resting in bed, assisted x2 to bedside commode. ,Voided x1 and returned back to bed. Patient offers no complaints. Shift assessment completed, will monitor.

## 2021-03-11 NOTE — PROGRESS NOTES
Care Management Interventions  PCP Verified by CM: Yes  Mode of Transport at Discharge: Self  Transition of Care Consult (CM Consult): [address]: Yes  Discharge Durable Medical Equipment: No  Physical Therapy Consult: Yes  Current Support Network: Own Home  Confirm Follow Up Transport: Family  The Plan for Transition of Care is Related to the Following Treatment Goals : return to independent function  The Patient and/or Patient Representative was Provided with a Choice of Provider and Agrees with the Discharge Plan?: Yes  Freedom of Choice List was Provided with Basic Dialogue that Supports the Patient's Individualized Plan of Care/Goals, Treatment Preferences and Shares the Quality Data Associated with the Providers?: Yes  Discharge Location  Discharge Placement: Home with home health    Patient discharging today to home with [facility]. Dtr plans to  after her own 1pm MD appt. .  Therapy wanted me to make sure family were prepared to provide almost total care as she is very debilitated. I spoke with dtr by phone. She confirmed that family is prepared to continuing to provide 24 hr care at home. They have multiple family members to help. We have added PT OT and aide to help. I have also given verbal instructions on how to obtain free meal delivery through her [address] [place] [address].

## 2021-03-11 NOTE — PROGRESS NOTES
2021         Post Op day: 2 Days Post-Op     Admit Date: 3/9/2021  Admit Diagnosis: Unilateral primary osteoarthritis, left hip [M16.12]  Arthritis of left hip [M16.12]        Subjective: having some pain, going very slow with therapy, No SOB, No Chest Pain, No Nausea or Vomiting     Objective:   Vital Signs are Stable, No Acute Distress, Alert and Oriented, Dressing is Dry,  Neurovascular exam is normal.     Assessment / Plan :  Patient Active Problem List   Diagnosis Code    Osteoarthritis of left knee M17.12    Total knee replacement status Z96.659    Osteoarthritis M19.90    S/P total hip arthroplasty Z96.649    Arthritis of left hip M16.12    History of total hip arthroplasty, left X05.415      Patient Vitals for the past 8 hrs:   BP Temp Pulse Resp SpO2   21 0531 111/69 98.1 °F (36.7 °C) 80 16 97 %   21 0046 97/62 98.8 °F (37.1 °C) 69 16 96 %    Temp (24hrs), Av.7 °F (37.1 °C), Min:98.1 °F (36.7 °C), Max:99.4 °F (37.4 °C)    Body mass index is 20.09 kg/m².     Lab Results   Component Value Date/Time    HGB 8.9 (L) 2021 06:50 PM      Pt seen by and discussed with Supervising Physician   Continue PT, current pain meds  Home today       Signed By: HENOK Armstrong

## 2021-03-11 NOTE — PROGRESS NOTES
Medicated for c/o left hip pain (7/10) with Norco 10/325mg (1 tab per patient request) po per MD order. Will monitor.

## 2021-03-11 NOTE — PROGRESS NOTES
Physical therapy - went to see patient and she was being discharged home. Daughter present. I made daughter aware that the patient still needs a lot of assistance with mobility and she was very aware and has plenty of help at home for her. When asked about stairs she said they will carry her in like they have been. They had no questions or concerns about going home.

## 2021-03-12 ENCOUNTER — HOME CARE VISIT (OUTPATIENT)
Dept: SCHEDULING | Facility: HOME HEALTH | Age: 76
End: 2021-03-12
Payer: MEDICARE

## 2021-03-12 VITALS
DIASTOLIC BLOOD PRESSURE: 63 MMHG | RESPIRATION RATE: 16 BRPM | HEART RATE: 82 BPM | TEMPERATURE: 98.8 F | SYSTOLIC BLOOD PRESSURE: 105 MMHG

## 2021-03-12 PROCEDURE — 400018 HH-NO PAY CLAIM PROCEDURE

## 2021-03-12 PROCEDURE — 3331090001 HH PPS REVENUE CREDIT

## 2021-03-12 PROCEDURE — 400013 HH SOC

## 2021-03-12 PROCEDURE — 3331090002 HH PPS REVENUE DEBIT

## 2021-03-12 PROCEDURE — G0151 HHCP-SERV OF PT,EA 15 MIN: HCPCS

## 2021-03-13 PROCEDURE — 3331090001 HH PPS REVENUE CREDIT

## 2021-03-13 PROCEDURE — 3331090002 HH PPS REVENUE DEBIT

## 2021-03-14 PROCEDURE — 3331090002 HH PPS REVENUE DEBIT

## 2021-03-14 PROCEDURE — 3331090001 HH PPS REVENUE CREDIT

## 2021-03-15 PROCEDURE — 3331090002 HH PPS REVENUE DEBIT

## 2021-03-15 PROCEDURE — 3331090001 HH PPS REVENUE CREDIT

## 2021-03-16 ENCOUNTER — HOME CARE VISIT (OUTPATIENT)
Dept: SCHEDULING | Facility: HOME HEALTH | Age: 76
End: 2021-03-16
Payer: MEDICARE

## 2021-03-16 VITALS
HEART RATE: 84 BPM | TEMPERATURE: 98.1 F | RESPIRATION RATE: 16 BRPM | OXYGEN SATURATION: 96 % | DIASTOLIC BLOOD PRESSURE: 68 MMHG | SYSTOLIC BLOOD PRESSURE: 104 MMHG

## 2021-03-16 PROCEDURE — 3331090001 HH PPS REVENUE CREDIT

## 2021-03-16 PROCEDURE — 3331090002 HH PPS REVENUE DEBIT

## 2021-03-16 PROCEDURE — G0152 HHCP-SERV OF OT,EA 15 MIN: HCPCS

## 2021-03-17 ENCOUNTER — HOME CARE VISIT (OUTPATIENT)
Dept: SCHEDULING | Facility: HOME HEALTH | Age: 76
End: 2021-03-17
Payer: MEDICARE

## 2021-03-17 VITALS
HEART RATE: 79 BPM | TEMPERATURE: 97.3 F | DIASTOLIC BLOOD PRESSURE: 68 MMHG | RESPIRATION RATE: 18 BRPM | SYSTOLIC BLOOD PRESSURE: 110 MMHG

## 2021-03-17 VITALS
HEART RATE: 72 BPM | RESPIRATION RATE: 15 BRPM | TEMPERATURE: 98.8 F | SYSTOLIC BLOOD PRESSURE: 116 MMHG | DIASTOLIC BLOOD PRESSURE: 70 MMHG

## 2021-03-17 PROCEDURE — 3331090001 HH PPS REVENUE CREDIT

## 2021-03-17 PROCEDURE — G0156 HHCP-SVS OF AIDE,EA 15 MIN: HCPCS

## 2021-03-17 PROCEDURE — G0157 HHC PT ASSISTANT EA 15: HCPCS

## 2021-03-17 PROCEDURE — 3331090002 HH PPS REVENUE DEBIT

## 2021-03-18 ENCOUNTER — HOME CARE VISIT (OUTPATIENT)
Dept: SCHEDULING | Facility: HOME HEALTH | Age: 76
End: 2021-03-18
Payer: MEDICARE

## 2021-03-18 PROCEDURE — 3331090001 HH PPS REVENUE CREDIT

## 2021-03-18 PROCEDURE — G0158 HHC OT ASSISTANT EA 15: HCPCS

## 2021-03-18 PROCEDURE — 3331090002 HH PPS REVENUE DEBIT

## 2021-03-19 ENCOUNTER — HOME CARE VISIT (OUTPATIENT)
Dept: SCHEDULING | Facility: HOME HEALTH | Age: 76
End: 2021-03-19
Payer: MEDICARE

## 2021-03-19 VITALS
TEMPERATURE: 99 F | OXYGEN SATURATION: 98 % | DIASTOLIC BLOOD PRESSURE: 72 MMHG | HEART RATE: 76 BPM | SYSTOLIC BLOOD PRESSURE: 102 MMHG | RESPIRATION RATE: 16 BRPM

## 2021-03-19 PROCEDURE — 3331090002 HH PPS REVENUE DEBIT

## 2021-03-19 PROCEDURE — G0157 HHC PT ASSISTANT EA 15: HCPCS

## 2021-03-19 PROCEDURE — 3331090001 HH PPS REVENUE CREDIT

## 2021-03-20 PROCEDURE — 3331090002 HH PPS REVENUE DEBIT

## 2021-03-20 PROCEDURE — 3331090001 HH PPS REVENUE CREDIT

## 2021-03-21 PROCEDURE — 3331090001 HH PPS REVENUE CREDIT

## 2021-03-21 PROCEDURE — 3331090002 HH PPS REVENUE DEBIT

## 2021-03-22 PROCEDURE — 3331090001 HH PPS REVENUE CREDIT

## 2021-03-22 PROCEDURE — 3331090002 HH PPS REVENUE DEBIT

## 2021-03-23 ENCOUNTER — HOME CARE VISIT (OUTPATIENT)
Dept: SCHEDULING | Facility: HOME HEALTH | Age: 76
End: 2021-03-23
Payer: MEDICARE

## 2021-03-23 VITALS
SYSTOLIC BLOOD PRESSURE: 122 MMHG | DIASTOLIC BLOOD PRESSURE: 70 MMHG | RESPIRATION RATE: 18 BRPM | HEART RATE: 80 BPM | TEMPERATURE: 99.3 F

## 2021-03-23 VITALS
RESPIRATION RATE: 16 BRPM | HEART RATE: 71 BPM | OXYGEN SATURATION: 99 % | SYSTOLIC BLOOD PRESSURE: 140 MMHG | DIASTOLIC BLOOD PRESSURE: 89 MMHG | TEMPERATURE: 97.9 F

## 2021-03-23 VITALS
SYSTOLIC BLOOD PRESSURE: 140 MMHG | HEART RATE: 66 BPM | DIASTOLIC BLOOD PRESSURE: 78 MMHG | RESPIRATION RATE: 16 BRPM | TEMPERATURE: 98.1 F | OXYGEN SATURATION: 98 %

## 2021-03-23 PROCEDURE — 3331090001 HH PPS REVENUE CREDIT

## 2021-03-23 PROCEDURE — G0158 HHC OT ASSISTANT EA 15: HCPCS

## 2021-03-23 PROCEDURE — 3331090002 HH PPS REVENUE DEBIT

## 2021-03-23 PROCEDURE — G0156 HHCP-SVS OF AIDE,EA 15 MIN: HCPCS

## 2021-03-23 PROCEDURE — G0157 HHC PT ASSISTANT EA 15: HCPCS

## 2021-03-24 ENCOUNTER — HOME CARE VISIT (OUTPATIENT)
Dept: SCHEDULING | Facility: HOME HEALTH | Age: 76
End: 2021-03-24
Payer: MEDICARE

## 2021-03-24 VITALS
SYSTOLIC BLOOD PRESSURE: 110 MMHG | HEART RATE: 74 BPM | OXYGEN SATURATION: 95 % | TEMPERATURE: 98.1 F | DIASTOLIC BLOOD PRESSURE: 64 MMHG | RESPIRATION RATE: 16 BRPM

## 2021-03-24 PROCEDURE — 3331090001 HH PPS REVENUE CREDIT

## 2021-03-24 PROCEDURE — G0152 HHCP-SERV OF OT,EA 15 MIN: HCPCS

## 2021-03-24 PROCEDURE — 3331090002 HH PPS REVENUE DEBIT

## 2021-03-25 ENCOUNTER — HOME CARE VISIT (OUTPATIENT)
Dept: SCHEDULING | Facility: HOME HEALTH | Age: 76
End: 2021-03-25
Payer: MEDICARE

## 2021-03-25 VITALS
TEMPERATURE: 98.3 F | HEART RATE: 64 BPM | DIASTOLIC BLOOD PRESSURE: 64 MMHG | RESPIRATION RATE: 15 BRPM | SYSTOLIC BLOOD PRESSURE: 116 MMHG

## 2021-03-25 PROCEDURE — G0157 HHC PT ASSISTANT EA 15: HCPCS

## 2021-03-25 PROCEDURE — 3331090002 HH PPS REVENUE DEBIT

## 2021-03-25 PROCEDURE — 3331090001 HH PPS REVENUE CREDIT

## 2021-03-26 PROCEDURE — 3331090001 HH PPS REVENUE CREDIT

## 2021-03-26 PROCEDURE — 3331090002 HH PPS REVENUE DEBIT

## 2021-03-27 PROCEDURE — 3331090001 HH PPS REVENUE CREDIT

## 2021-03-27 PROCEDURE — 3331090002 HH PPS REVENUE DEBIT

## 2021-03-28 PROCEDURE — 3331090002 HH PPS REVENUE DEBIT

## 2021-03-28 PROCEDURE — 3331090001 HH PPS REVENUE CREDIT

## 2021-03-29 PROCEDURE — 3331090001 HH PPS REVENUE CREDIT

## 2021-03-29 PROCEDURE — 3331090002 HH PPS REVENUE DEBIT

## 2021-03-30 ENCOUNTER — HOME CARE VISIT (OUTPATIENT)
Dept: SCHEDULING | Facility: HOME HEALTH | Age: 76
End: 2021-03-30
Payer: MEDICARE

## 2021-03-30 VITALS
RESPIRATION RATE: 18 BRPM | DIASTOLIC BLOOD PRESSURE: 70 MMHG | SYSTOLIC BLOOD PRESSURE: 118 MMHG | TEMPERATURE: 98.5 F | HEART RATE: 82 BPM

## 2021-03-30 PROCEDURE — 3331090001 HH PPS REVENUE CREDIT

## 2021-03-30 PROCEDURE — G0156 HHCP-SVS OF AIDE,EA 15 MIN: HCPCS

## 2021-03-30 PROCEDURE — 3331090002 HH PPS REVENUE DEBIT

## 2021-03-31 PROCEDURE — 3331090001 HH PPS REVENUE CREDIT

## 2021-03-31 PROCEDURE — 3331090002 HH PPS REVENUE DEBIT

## 2021-04-01 ENCOUNTER — HOME CARE VISIT (OUTPATIENT)
Dept: SCHEDULING | Facility: HOME HEALTH | Age: 76
End: 2021-04-01
Payer: MEDICARE

## 2021-04-01 ENCOUNTER — HOME CARE VISIT (OUTPATIENT)
Dept: HOME HEALTH SERVICES | Facility: HOME HEALTH | Age: 76
End: 2021-04-01
Payer: MEDICARE

## 2021-04-01 VITALS
DIASTOLIC BLOOD PRESSURE: 66 MMHG | RESPIRATION RATE: 15 BRPM | TEMPERATURE: 98.7 F | HEART RATE: 72 BPM | SYSTOLIC BLOOD PRESSURE: 104 MMHG

## 2021-04-01 PROCEDURE — G0157 HHC PT ASSISTANT EA 15: HCPCS

## 2021-04-01 PROCEDURE — 3331090001 HH PPS REVENUE CREDIT

## 2021-04-01 PROCEDURE — 3331090002 HH PPS REVENUE DEBIT

## 2021-04-01 NOTE — PROGRESS NOTES
Per conversation with , Linda Barfield, PT and , Artie Iglesias, PT, PTA contacted nurse navigator Nilsa Rossi RN at 5:19pm and left a message to inform that this pt is non-compliant in HEP and is progressing very slowly. During therapy session today, PTA asked pt further about her compliance, and the pt was unable to recall exercises, stating that she \"moves her legs when sitting or laying down all the time\". When asked about the specific exercises, the pt stated she does not complete them. PTA explained importance of compliance and consequences of non-compliance. Pt verbalized understanding. Will continue to progress as tolerated.

## 2021-04-02 PROCEDURE — 3331090001 HH PPS REVENUE CREDIT

## 2021-04-02 PROCEDURE — 3331090002 HH PPS REVENUE DEBIT

## 2021-04-03 PROCEDURE — 3331090001 HH PPS REVENUE CREDIT

## 2021-04-03 PROCEDURE — 3331090002 HH PPS REVENUE DEBIT

## 2021-04-04 PROCEDURE — 3331090002 HH PPS REVENUE DEBIT

## 2021-04-04 PROCEDURE — 3331090001 HH PPS REVENUE CREDIT

## 2021-04-05 ENCOUNTER — HOME CARE VISIT (OUTPATIENT)
Dept: SCHEDULING | Facility: HOME HEALTH | Age: 76
End: 2021-04-05
Payer: MEDICARE

## 2021-04-05 VITALS
HEART RATE: 80 BPM | RESPIRATION RATE: 18 BRPM | TEMPERATURE: 97.9 F | DIASTOLIC BLOOD PRESSURE: 70 MMHG | SYSTOLIC BLOOD PRESSURE: 122 MMHG

## 2021-04-05 PROCEDURE — 3331090001 HH PPS REVENUE CREDIT

## 2021-04-05 PROCEDURE — 3331090002 HH PPS REVENUE DEBIT

## 2021-04-06 ENCOUNTER — HOME CARE VISIT (OUTPATIENT)
Dept: SCHEDULING | Facility: HOME HEALTH | Age: 76
End: 2021-04-06
Payer: MEDICARE

## 2021-04-06 VITALS
SYSTOLIC BLOOD PRESSURE: 102 MMHG | DIASTOLIC BLOOD PRESSURE: 60 MMHG | HEART RATE: 84 BPM | OXYGEN SATURATION: 97 % | RESPIRATION RATE: 15 BRPM | TEMPERATURE: 99 F

## 2021-04-06 PROCEDURE — 3331090002 HH PPS REVENUE DEBIT

## 2021-04-06 PROCEDURE — G0157 HHC PT ASSISTANT EA 15: HCPCS

## 2021-04-06 PROCEDURE — 3331090001 HH PPS REVENUE CREDIT

## 2021-04-06 NOTE — PROGRESS NOTES
PTA contacted Dr. Anglin Captsandrita office today at 2:59pm and left a message to inform of slow progression. PTA reached out last week to Sara Narvaez RN, and she directed PTA to contact Dr. Khloe Love. After visit today, pt continues to show very slow progression with non-compliant tendencies. Pt is set for discharge this Thursday, and due to non-compliance issue and slow progression, discharge may be appropriate. PTA reinforced compliance importance and form with all exercises in HEP, and pt verbalized understanding.

## 2021-04-07 PROCEDURE — 3331090001 HH PPS REVENUE CREDIT

## 2021-04-07 PROCEDURE — 3331090002 HH PPS REVENUE DEBIT

## 2021-04-08 ENCOUNTER — HOME CARE VISIT (OUTPATIENT)
Dept: SCHEDULING | Facility: HOME HEALTH | Age: 76
End: 2021-04-08
Payer: MEDICARE

## 2021-04-08 VITALS
HEART RATE: 71 BPM | TEMPERATURE: 98.4 F | DIASTOLIC BLOOD PRESSURE: 63 MMHG | SYSTOLIC BLOOD PRESSURE: 110 MMHG | RESPIRATION RATE: 18 BRPM

## 2021-04-08 PROCEDURE — 3331090002 HH PPS REVENUE DEBIT

## 2021-04-08 PROCEDURE — 3331090001 HH PPS REVENUE CREDIT

## 2021-04-08 PROCEDURE — G0151 HHCP-SERV OF PT,EA 15 MIN: HCPCS

## 2021-05-11 ENCOUNTER — HOME HEALTH ADMISSION (OUTPATIENT)
Dept: HOME HEALTH SERVICES | Facility: HOME HEALTH | Age: 76
End: 2021-05-11

## 2021-08-23 ENCOUNTER — HOSPITAL ENCOUNTER (EMERGENCY)
Age: 76
Discharge: HOME | End: 2021-08-24
Attending: EMERGENCY MEDICINE
Payer: MEDICARE

## 2021-08-23 ENCOUNTER — APPOINTMENT (OUTPATIENT)
Dept: GENERAL RADIOLOGY | Age: 76
End: 2021-08-23
Attending: EMERGENCY MEDICINE
Payer: MEDICARE

## 2021-08-23 DIAGNOSIS — K59.00 CONSTIPATION, UNSPECIFIED CONSTIPATION TYPE: ICD-10-CM

## 2021-08-23 DIAGNOSIS — R10.13 ABDOMINAL PAIN, EPIGASTRIC: Primary | ICD-10-CM

## 2021-08-23 PROCEDURE — 84484 ASSAY OF TROPONIN QUANT: CPT

## 2021-08-23 PROCEDURE — 93005 ELECTROCARDIOGRAM TRACING: CPT | Performed by: EMERGENCY MEDICINE

## 2021-08-23 PROCEDURE — 80053 COMPREHEN METABOLIC PANEL: CPT

## 2021-08-23 PROCEDURE — 81003 URINALYSIS AUTO W/O SCOPE: CPT

## 2021-08-23 PROCEDURE — 99285 EMERGENCY DEPT VISIT HI MDM: CPT

## 2021-08-23 PROCEDURE — 85025 COMPLETE CBC W/AUTO DIFF WBC: CPT

## 2021-08-23 PROCEDURE — 83735 ASSAY OF MAGNESIUM: CPT

## 2021-08-23 PROCEDURE — 71045 X-RAY EXAM CHEST 1 VIEW: CPT

## 2021-08-23 PROCEDURE — 83690 ASSAY OF LIPASE: CPT

## 2021-08-23 RX ORDER — SODIUM CHLORIDE 0.9 % (FLUSH) 0.9 %
5-10 SYRINGE (ML) INJECTION AS NEEDED
Status: DISCONTINUED | OUTPATIENT
Start: 2021-08-23 | End: 2021-08-24 | Stop reason: HOSPADM

## 2021-08-23 RX ORDER — HYDROCODONE BITARTRATE AND ACETAMINOPHEN 10; 325 MG/1; MG/1
TABLET ORAL
COMMUNITY
Start: 2021-08-06

## 2021-08-23 RX ORDER — SODIUM CHLORIDE 0.9 % (FLUSH) 0.9 %
5-10 SYRINGE (ML) INJECTION EVERY 8 HOURS
Status: DISCONTINUED | OUTPATIENT
Start: 2021-08-23 | End: 2021-08-24 | Stop reason: HOSPADM

## 2021-08-24 ENCOUNTER — APPOINTMENT (OUTPATIENT)
Dept: GENERAL RADIOLOGY | Age: 76
End: 2021-08-24
Attending: EMERGENCY MEDICINE
Payer: MEDICARE

## 2021-08-24 ENCOUNTER — APPOINTMENT (OUTPATIENT)
Dept: CT IMAGING | Age: 76
End: 2021-08-24
Attending: EMERGENCY MEDICINE
Payer: MEDICARE

## 2021-08-24 VITALS
DIASTOLIC BLOOD PRESSURE: 75 MMHG | HEIGHT: 71 IN | SYSTOLIC BLOOD PRESSURE: 131 MMHG | BODY MASS INDEX: 21 KG/M2 | WEIGHT: 150 LBS | RESPIRATION RATE: 12 BRPM | OXYGEN SATURATION: 97 % | TEMPERATURE: 99.2 F | HEART RATE: 67 BPM

## 2021-08-24 LAB
ALBUMIN SERPL-MCNC: 3 G/DL (ref 3.2–4.6)
ALBUMIN/GLOB SERPL: 0.5 {RATIO} (ref 1.2–3.5)
ALP SERPL-CCNC: 60 U/L (ref 50–136)
ALT SERPL-CCNC: 14 U/L (ref 12–65)
ANION GAP SERPL CALC-SCNC: 6 MMOL/L (ref 7–16)
APPEARANCE UR: CLEAR
AST SERPL-CCNC: 30 U/L (ref 15–37)
ATRIAL RATE: 96 BPM
BASOPHILS # BLD: 0 K/UL (ref 0–0.2)
BASOPHILS NFR BLD: 0 % (ref 0–2)
BILIRUB SERPL-MCNC: 0.4 MG/DL (ref 0.2–1.1)
BILIRUB UR QL: NEGATIVE
BUN SERPL-MCNC: 10 MG/DL (ref 8–23)
CALCIUM SERPL-MCNC: 9.4 MG/DL (ref 8.3–10.4)
CALCULATED P AXIS, ECG09: 77 DEGREES
CALCULATED R AXIS, ECG10: -17 DEGREES
CALCULATED T AXIS, ECG11: 55 DEGREES
CHLORIDE SERPL-SCNC: 108 MMOL/L (ref 98–107)
CO2 SERPL-SCNC: 24 MMOL/L (ref 21–32)
COLOR UR: YELLOW
CREAT SERPL-MCNC: 0.94 MG/DL (ref 0.6–1)
DIAGNOSIS, 93000: NORMAL
DIFFERENTIAL METHOD BLD: ABNORMAL
EOSINOPHIL # BLD: 0.1 K/UL (ref 0–0.8)
EOSINOPHIL NFR BLD: 2 % (ref 0.5–7.8)
ERYTHROCYTE [DISTWIDTH] IN BLOOD BY AUTOMATED COUNT: 18 % (ref 11.9–14.6)
GLOBULIN SER CALC-MCNC: 6.3 G/DL (ref 2.3–3.5)
GLUCOSE SERPL-MCNC: 122 MG/DL (ref 65–100)
GLUCOSE UR STRIP.AUTO-MCNC: NEGATIVE MG/DL
HCT VFR BLD AUTO: 35.6 % (ref 35.8–46.3)
HGB BLD-MCNC: 11.4 G/DL (ref 11.7–15.4)
HGB UR QL STRIP: NEGATIVE
IMM GRANULOCYTES # BLD AUTO: 0 K/UL (ref 0–0.5)
IMM GRANULOCYTES NFR BLD AUTO: 0 % (ref 0–5)
KETONES UR QL STRIP.AUTO: NEGATIVE MG/DL
LEUKOCYTE ESTERASE UR QL STRIP.AUTO: NEGATIVE
LIPASE SERPL-CCNC: 126 U/L (ref 73–393)
LYMPHOCYTES # BLD: 1 K/UL (ref 0.5–4.6)
LYMPHOCYTES NFR BLD: 20 % (ref 13–44)
MAGNESIUM SERPL-MCNC: 2.2 MG/DL (ref 1.8–2.4)
MCH RBC QN AUTO: 28.4 PG (ref 26.1–32.9)
MCHC RBC AUTO-ENTMCNC: 32 G/DL (ref 31.4–35)
MCV RBC AUTO: 88.6 FL (ref 79.6–97.8)
MONOCYTES # BLD: 0.5 K/UL (ref 0.1–1.3)
MONOCYTES NFR BLD: 8 % (ref 4–12)
NEUTS SEG # BLD: 3.7 K/UL (ref 1.7–8.2)
NEUTS SEG NFR BLD: 70 % (ref 43–78)
NITRITE UR QL STRIP.AUTO: NEGATIVE
NRBC # BLD: 0 K/UL (ref 0–0.2)
P-R INTERVAL, ECG05: 192 MS
PH UR STRIP: 7.5 [PH] (ref 5–9)
PLATELET # BLD AUTO: 224 K/UL (ref 150–450)
PMV BLD AUTO: 9.7 FL (ref 9.4–12.3)
POTASSIUM SERPL-SCNC: 4 MMOL/L (ref 3.5–5.1)
PROT SERPL-MCNC: 9.3 G/DL (ref 6.3–8.2)
PROT UR STRIP-MCNC: NEGATIVE MG/DL
Q-T INTERVAL, ECG07: 348 MS
QRS DURATION, ECG06: 78 MS
QTC CALCULATION (BEZET), ECG08: 414 MS
RBC # BLD AUTO: 4.02 M/UL (ref 4.05–5.2)
SODIUM SERPL-SCNC: 138 MMOL/L (ref 136–145)
SP GR UR REFRACTOMETRY: 1.01 (ref 1–1.02)
TROPONIN-HIGH SENSITIVITY: 19.7 PG/ML (ref 0–14)
TROPONIN-HIGH SENSITIVITY: 26 PG/ML (ref 0–14)
UROBILINOGEN UR QL STRIP.AUTO: 0.2 EU/DL (ref 0.2–1)
VENTRICULAR RATE, ECG03: 85 BPM
WBC # BLD AUTO: 5.3 K/UL (ref 4.3–11.1)

## 2021-08-24 PROCEDURE — 74177 CT ABD & PELVIS W/CONTRAST: CPT

## 2021-08-24 PROCEDURE — 71046 X-RAY EXAM CHEST 2 VIEWS: CPT

## 2021-08-24 PROCEDURE — 74011000636 HC RX REV CODE- 636: Performed by: EMERGENCY MEDICINE

## 2021-08-24 PROCEDURE — 81003 URINALYSIS AUTO W/O SCOPE: CPT

## 2021-08-24 PROCEDURE — 74011000258 HC RX REV CODE- 258: Performed by: EMERGENCY MEDICINE

## 2021-08-24 PROCEDURE — 84484 ASSAY OF TROPONIN QUANT: CPT

## 2021-08-24 RX ORDER — SUCRALFATE 1 G/1
1 TABLET ORAL 4 TIMES DAILY
Qty: 40 TABLET | Refills: 0 | Status: SHIPPED | OUTPATIENT
Start: 2021-08-24

## 2021-08-24 RX ORDER — POLYETHYLENE GLYCOL 3350 17 G/17G
17 POWDER, FOR SOLUTION ORAL DAILY
Qty: 238 G | Refills: 0 | Status: SHIPPED | OUTPATIENT
Start: 2021-08-24

## 2021-08-24 RX ORDER — DOCUSATE SODIUM 100 MG/1
100 CAPSULE, LIQUID FILLED ORAL 2 TIMES DAILY
Qty: 60 CAPSULE | Refills: 0 | Status: SHIPPED | OUTPATIENT
Start: 2021-08-24 | End: 2021-09-23

## 2021-08-24 RX ORDER — SODIUM CHLORIDE 0.9 % (FLUSH) 0.9 %
10 SYRINGE (ML) INJECTION
Status: COMPLETED | OUTPATIENT
Start: 2021-08-24 | End: 2021-08-24

## 2021-08-24 RX ADMIN — SODIUM CHLORIDE 100 ML: 900 INJECTION, SOLUTION INTRAVENOUS at 02:13

## 2021-08-24 RX ADMIN — IOPAMIDOL 100 ML: 755 INJECTION, SOLUTION INTRAVENOUS at 02:13

## 2021-08-24 RX ADMIN — Medication 10 ML: at 02:14

## 2021-08-24 NOTE — ED PROVIDER NOTES
69 yo female presents with c/o Left cp. ... Patient reports onset of chest pain around 2:00 this afternoon  Nonradiating  Patient is very vague in her description of the discomfort    She denies shortness of breath, diaphoresis, nausea or vomiting. She really does not have any specific exacerbating or alleviating factors to the pain    He denies back pain    Patient does have a history of reflux disease. She states that this discomfort really does not mimic that reflux. The history is provided by the patient. Chest Pain   This is a new problem. The current episode started 6 to 12 hours ago. The problem has not changed since onset. The problem occurs hourly. The pain is associated with normal activity. The pain is present in the left side. The pain is moderate. The quality of the pain is described as dull. The pain does not radiate. Exacerbated by: no exac or allev. factors. Associated symptoms include abdominal pain, malaise/fatigue and weakness. Pertinent negatives include no back pain, no cough, no fever, no headaches, no nausea, no palpitations, no shortness of breath and no vomiting. She has tried nothing for the symptoms. Risk factors include hypertension and a sendentary lifestyle. Her past medical history does not include DM or HTN. Procedural history includes cardiac catheterization.        Past Medical History:   Diagnosis Date    Anxiety     med    Arthritis     rheumatoid     Atrial fibrillation (HCC)     Eliquis BID, Followed by PCP     CAD (coronary artery disease)     mild on cath 2013 (done for +stress with anteroapical ischemia)     Cancer (HCC)     uterine    Constipation     med    Fibromyalgia     GERD (gastroesophageal reflux disease)     med    Hypercholesteremia     med    Hypertension     med    Iron deficiency anemia     PAD [facility]peripheral artery disease) ([facility])     left leg: pt reports instructed to wear compression hose    Polyneuropathy     Rheumatoid arthritis ([address]     Thyroid disease     hypo- med    Uterine cancer ([facility]     hysterectomy     Vulva neoplasm     squamous cell, resected 2016        Past Surgical History:   Procedure Laterality Date    HX CATARACT REMOVAL Left     HX HEART CATHETERIZATION      HX HIP REPLACEMENT Right     HX KNEE REPLACEMENT Bilateral     HX LAP CHOLECYSTECTOMY      HX OTHER SURGICAL      EXCISION OF ANAL TAGS AND FULGURATION     HX OTHER SURGICAL      VULVA SURGERY FOR CANCER     HX PARATHYROIDECTOMY      HX TOTAL ABDOMINAL HYSTERECTOMY           History reviewed. No pertinent family history. Social History     Socioeconomic History    Marital status:      Spouse name: Not on file    Number of children: Not on file    Years of education: Not on file    Highest education level: Not on file   Occupational History    Not on file   Tobacco Use    Smoking status: Current Every Day Smoker     Packs/day: 0.10     Years: 30.00     Pack years: 3.00    Smokeless tobacco: Never Used   Vaping Use    Vaping Use: Former   Substance and Sexual Activity    Alcohol use: Not Currently     Comment: occas    Drug use: No    Sexual activity: Not on file   Other Topics Concern    Not on file   Social History Narrative    Not on file     Social Determinants of Health     Financial Resource Strain:     Difficulty of Paying Living Expenses:    Food Insecurity:     Worried About [address] in the Last Year:     [address] in the Last Year:    Transportation Needs:     Lack of Transportation (Medical):      Lack of Transportation (Non-Medical):    Physical Activity:     Days of Exercise per Week:     Minutes of Exercise per Session:    Stress:     Feeling of Stress :    Social Connections:     Frequency of Communication with Friends and Family:     Frequency of Social Gatherings with Friends and Family:     Attends Confucianism Services:     Active Member of Clubs or Organizations:     Attends Club or Organization Meetings:     Marital Status:    Intimate Partner Violence:     Fear of Current or Ex-Partner:     Emotionally Abused:     Physically Abused:     Sexually Abused: ALLERGIES: Lisinopril    Review of Systems   Constitutional: Positive for malaise/fatigue. Negative for chills and fever. HENT: Negative for congestion, ear pain and rhinorrhea. Eyes: Negative for photophobia and discharge. Respiratory: Negative for cough and shortness of breath. Cardiovascular: Positive for chest pain. Negative for palpitations. Gastrointestinal: Positive for abdominal pain. Negative for constipation, diarrhea, nausea and vomiting. Endocrine: Negative for cold intolerance and heat intolerance. Genitourinary: Negative for dysuria and flank pain. Musculoskeletal: Negative for arthralgias, back pain, myalgias and neck pain. Skin: Negative for rash and wound. Allergic/Immunologic: Negative for environmental allergies and food allergies. Neurological: Positive for weakness. Negative for syncope and headaches. Hematological: Negative for adenopathy. Does not bruise/bleed easily. Psychiatric/Behavioral: Negative for dysphoric mood. The patient is not nervous/anxious. All other systems reviewed and are negative. Vitals:    08/23/21 2333   BP: (!) 168/83   Pulse: 83   Resp: 18   Temp: 99.2 °F (37.3 °C)   SpO2: 98%   Weight: 68 kg (150 lb)   Height: 5' 11\" (1.803 m)            Physical Exam  Vitals and nursing note reviewed. Constitutional:       General: She is in acute distress. Appearance: Normal appearance. She is well-developed and normal weight. HENT:      Head: Normocephalic and atraumatic. Right Ear: External ear normal.      Left Ear: External ear normal.      Mouth/Throat:      Pharynx: No oropharyngeal exudate. Eyes:      Extraocular Movements: Extraocular movements intact.       Conjunctiva/sclera: Conjunctivae normal.      Pupils: Pupils are equal, round, and reactive to light.   Neck:      Vascular: No JVD. Cardiovascular:      Rate and Rhythm: Normal rate and regular rhythm. Heart sounds: Normal heart sounds. Heart sounds not distant. No murmur heard. No friction rub. No gallop. Pulmonary:      Effort: Pulmonary effort is normal.      Breath sounds: Normal breath sounds. No decreased breath sounds. Chest:       Abdominal:      General: Bowel sounds are normal. There is no distension. Palpations: Abdomen is soft. There is no mass. Tenderness: There is generalized abdominal tenderness. There is no right CVA tenderness, left CVA tenderness, guarding or rebound. Comments: Patient complains of somewhat generalized abdominal discomfort with palpation. There is no rigidity rebound or guarding discomfort seems to be most prominent in the epigastric and suprapubic regions. Unfortunately the patient is somewhat vague in her answers about location and intensity of the pain. Musculoskeletal:         General: No deformity. Normal range of motion. Cervical back: Normal range of motion and neck supple. Right lower leg: Edema present. Left lower leg: Edema present. Skin:     General: Skin is warm and dry. Capillary Refill: Capillary refill takes less than 2 seconds. Findings: No rash. Neurological:      General: No focal deficit present. Mental Status: She is alert and oriented to person, place, and time. Cranial Nerves: No cranial nerve deficit. Sensory: No sensory deficit. Gait: Gait normal.   Psychiatric:         Mood and Affect: Affect is blunt and flat. Speech: Speech normal.         Behavior: Behavior normal. Behavior is cooperative. Thought Content:  Thought content normal.         Judgment: Judgment normal.          MDM  Number of Diagnoses or Management Options  Abdominal pain, epigastric: new and requires workup  Constipation, unspecified constipation type: new and requires workup  Diagnosis management comments: 78-year-old female initially presents via EMS with about 9 to 10 hours of left-sided dull chest pain  History of A. fib and states compliance with her medications. She is followed by [facility]. She denies history of coronary disease    On exam tonight, the patient also demonstrates fairly significant tenderness in the abdomen epigastric and suprapubic  There is no rigidity rebound or guarding    Initial EKG is unremarkable    Labs and chest x-ray pending    4:09 AM  Troponin negative x2    CT abdomen pelvis only revealed some constipation but no other acute findings    Patient will be discharged with symptomatic care  Reviewed with family    Close follow-up advised         Amount and/or Complexity of Data Reviewed  Clinical lab tests: ordered and reviewed  Tests in the radiology section of CPT®: ordered and reviewed  Tests in the medicine section of CPT®: ordered and reviewed  Decide to obtain previous medical records or to obtain history from someone other than the patient: yes  Obtain history from someone other than the patient: yes  Review and summarize past medical records: yes  Independent visualization of images, tracings, or specimens: yes    Risk of Complications, Morbidity, and/or Mortality  Presenting problems: moderate  Diagnostic procedures: moderate  Management options: moderate  General comments: Elements of this note have been dictated via voice recognition software. Text and phrases may be limited by the accuracy of the software. The chart has been reviewed, but errors may still be present.       Patient Progress  Patient progress: stable         EKG    Date/Time: 8/23/2021 11:50 PM  Performed by: Yannick Cueva MD  Authorized by: Yannick Cueva MD     ECG reviewed by ED Physician in the absence of a cardiologist: yes    Previous ECG:     Previous ECG:  Compared to current    Similarity:  No change  Interpretation:     Interpretation: non-specific    Rhythm: Rhythm: sinus rhythm    Ectopy:     Ectopy: none    QRS:     QRS axis:  Right  Conduction:     Conduction: abnormal      Abnormal conduction: non-specific intraventricular conduction delay    ST segments:     ST segments:  Normal  T waves:     T waves: normal    Comments:      No acute ischemic changes  No significant interval change from prior tracing

## 2021-08-24 NOTE — DISCHARGE INSTRUCTIONS
Continue all your current medications  Take new medications as prescribed  Drink plenty of fluids  Call your doctor in the morning for recheck appointment    Return to ER for any worsening symptoms or new problems which may arise

## 2021-08-24 NOTE — ED NOTES
Pt brief chnged , I   have reviewed discharge instructions with the patient and caregiver. The patient and caregiver verbalized understanding. Patient left ED via Discharge Method: wheelchair to Home with daughter. Opportunity for questions and clarification provided. Patient given 2 scripts. To continue your aftercare when you leave the hospital, you may receive an automated call from our care team to check in on how you are doing. This is a free service and part of our promise to provide the best care and service to meet your aftercare needs.  If you have questions, or wish to unsubscribe from this service please call [phone]. Thank you for Choosing our [facility] Emergency Department.

## 2022-03-18 PROBLEM — M16.12 ARTHRITIS OF LEFT HIP: Status: ACTIVE | Noted: 2021-03-09

## 2022-03-19 PROBLEM — Z96.642 HISTORY OF TOTAL HIP ARTHROPLASTY, LEFT: Status: ACTIVE | Noted: 2021-03-10

## 2023-11-17 ENCOUNTER — APPOINTMENT (OUTPATIENT)
Dept: ULTRASOUND IMAGING | Age: 78
End: 2023-11-17
Payer: MEDICARE

## 2023-11-17 ENCOUNTER — HOSPITAL ENCOUNTER (EMERGENCY)
Age: 78
Discharge: HOME | End: 2023-11-17
Payer: MEDICARE

## 2023-11-17 ENCOUNTER — APPOINTMENT (OUTPATIENT)
Dept: GENERAL RADIOLOGY | Age: 78
End: 2023-11-17
Payer: MEDICARE

## 2023-11-17 VITALS
DIASTOLIC BLOOD PRESSURE: 79 MMHG | BODY MASS INDEX: 31.67 KG/M2 | WEIGHT: 209 LBS | TEMPERATURE: 98 F | RESPIRATION RATE: 16 BRPM | HEIGHT: 68 IN | HEART RATE: 67 BPM | OXYGEN SATURATION: 98 % | SYSTOLIC BLOOD PRESSURE: 138 MMHG

## 2023-11-17 DIAGNOSIS — M79.89 LEFT LEG SWELLING: Primary | ICD-10-CM

## 2023-11-17 PROCEDURE — 99284 EMERGENCY DEPT VISIT MOD MDM: CPT

## 2023-11-17 PROCEDURE — 6370000000 HC RX 637 (ALT 250 FOR IP): Performed by: PHYSICIAN ASSISTANT

## 2023-11-17 PROCEDURE — 93971 EXTREMITY STUDY: CPT

## 2023-11-17 PROCEDURE — 73610 X-RAY EXAM OF ANKLE: CPT

## 2023-11-17 RX ORDER — HYDROCODONE BITARTRATE AND ACETAMINOPHEN 5; 325 MG/1; MG/1
1 TABLET ORAL
Status: COMPLETED | OUTPATIENT
Start: 2023-11-17 | End: 2023-11-17

## 2023-11-17 RX ADMIN — HYDROCODONE BITARTRATE AND ACETAMINOPHEN 1 TABLET: 5; 325 TABLET ORAL at 14:49

## 2023-11-17 ASSESSMENT — ENCOUNTER SYMPTOMS
CHEST TIGHTNESS: 0
SHORTNESS OF BREATH: 0
ABDOMINAL DISTENTION: 0
DIARRHEA: 0
SORE THROAT: 0
EYE REDNESS: 0
COUGH: 0
RHINORRHEA: 0
VOMITING: 0
NAUSEA: 0
BACK PAIN: 0

## 2023-11-17 ASSESSMENT — PAIN - FUNCTIONAL ASSESSMENT: PAIN_FUNCTIONAL_ASSESSMENT: 0-10

## 2023-11-17 ASSESSMENT — PAIN SCALES - GENERAL: PAINLEVEL_OUTOF10: 5

## 2023-11-17 NOTE — DISCHARGE INSTRUCTIONS
Continue to elevate the left leg above the level of the heart. Can wear compression stockings or continue to apply Ace wrap to allow for some gentle compression to help with the swelling in the area. If Monday she is still having persistent swelling in the left leg please call her doctor to schedule close follow-up for further evaluation. Return to the ER with any worsening or concerning symptoms.

## 2023-11-17 NOTE — ED PROVIDER NOTES
Emergency Department Provider Note       PCP: DEEPTHI Cobb NP   Age: 66 y.o. Sex: female     DISPOSITION Decision To Discharge 11/17/2023 03:59:14 PM       ICD-10-CM    1. Left leg swelling  M79.89           Medical Decision Making     Complexity of Problems Addressed:  Acute illness or injury    Data Reviewed and Analyzed:  I independently ordered and reviewed each unique test.         I interpreted the X-rays XR left ankle without any obvious fracture. Discussion of management or test interpretation. Patient is a 80-year-old female presenting with left lower leg swelling and pain x5 days. No preceding trauma or injury to the area. She does take Eliquis for atrial fibrillation. She is afebrile, vital signs stable on exam.  She does have swelling to the left lower leg and pain with palpation to the area, no significant rash redness or skin changes. Pulses intact. X-rays of the ankle negative for any acute process. Ultrasound of the left lower extremity negative for any acute DVT. All results discussed at length with patient and family at bedside. Ace wrap applied for compression and advised to RICE the leg. Discussed follow-up as well as reasons to return to the ED. All agree with plan. Risk of Complications and/or Morbidity of Patient Management:  Shared medical decision making was utilized in creating the patients health plan today. History       Patient is a 80-year-old female who presents with complaint of left lower extremity pain and swelling x5 days. First noticed symptoms on Sunday evening with slight swelling in the left lower extremity. This has appeared to worsen over the last 5 days. She went to urgent care today and they advised her to come to the ER as they were concerned she could have a blood clot. She has no previous history of DVTs. She does take Eliquis because she has atrial fibrillation.   She denies any preceding trauma or injury to her lower

## 2023-11-17 NOTE — DISCHARGE INSTR - COC
Continuity of Care Form    Patient Name: Francisco Sanz   :  1945  MRN:  [id number]    Admit date:  2023  Discharge date:  ***    Code Status Order: No Order   Advance Directives:     Admitting Physician:  No admitting provider for patient encounter.   PCP: DEEPTHI Smith NP    Discharging Nurse: [facility] Unit/Room#: RPA1/RP1  Discharging Unit Phone Number: ***    Emergency Contact:   Extended Emergency Contact Information  Primary Emergency Contact: [facility] Phone: [phone]  Mobile Phone: [phone]  Relation: Child  Secondary Emergency Contact: [facility] Phone: [phone]  Mobile Phone: [phone]  Relation: Grandchild    Past Surgical History:  Past Surgical History:   Procedure Laterality Date    CARDIAC CATHETERIZATION      CATARACT REMOVAL Left     CHOLECYSTECTOMY, LAPAROSCOPIC      HYSTERECTOMY, TOTAL ABDOMINAL (CERVIX REMOVED)      OTHER SURGICAL HISTORY      EXCISION OF ANAL TAGS AND FULGURATION     OTHER SURGICAL HISTORY      VULVA SURGERY FOR CANCER     PARATHYROIDECTOMY      TOTAL HIP ARTHROPLASTY Right     TOTAL KNEE ARTHROPLASTY Bilateral        Immunization History:   Immunization History   Administered Date(s) Administered    COVID-19, MODERNA BLUE border, Primary or Immunocompromised, (age 12y+), IM, 100 mcg/0.5mL 2021, 2021    COVID-19, MODERNA Bivalent, (age 12y+), IM, 48 mcg/0.5 mL 2022    COVID-19, MODERNA Booster BLUE border, (age 18y+), IM, 50mcg/0.25mL 2022    Influenza, FLUARIX, FLULAVAL, FLUZONE (age 10 mo+) AND AFLURIA, (age 1 y+), PF, 0.5mL 10/09/2015    PPD Test 10/08/2015       Active Problems:  Patient Active Problem List   Diagnosis Code    Osteoarthritis of left knee M17.12    Arthritis of left hip M16.12    S/P total hip arthroplasty Z96.649    Osteoarthritis M19.90    Total knee replacement status Z96.659    History of total hip arthroplasty, left I54.077       Isolation/Infection:

## 2023-12-05 ENCOUNTER — OFFICE VISIT (OUTPATIENT)
Dept: ORTHOPEDIC SURGERY | Age: 78
End: 2023-12-05
Payer: MEDICARE

## 2023-12-05 DIAGNOSIS — M25.572 ACUTE LEFT ANKLE PAIN: Primary | ICD-10-CM

## 2023-12-05 PROBLEM — E89.2 S/P SUBTOTAL PARATHYROIDECTOMY (HCC): Status: ACTIVE | Noted: 2022-07-07

## 2023-12-05 PROBLEM — E78.5 HYPERLIPIDEMIA: Status: ACTIVE | Noted: 2019-02-20

## 2023-12-05 PROBLEM — I25.10 ATHEROSCLEROTIC HEART DISEASE OF NATIVE CORONARY ARTERY WITHOUT ANGINA PECTORIS: Status: ACTIVE | Noted: 2022-10-22

## 2023-12-05 PROBLEM — R51.9 HEADACHE: Status: ACTIVE | Noted: 2022-05-01

## 2023-12-05 PROBLEM — I07.1 MODERATE TRICUSPID REGURGITATION: Status: ACTIVE | Noted: 2022-04-08

## 2023-12-05 PROBLEM — R53.1 WEAKNESS OF LEFT SIDE OF BODY: Status: ACTIVE | Noted: 2019-09-12

## 2023-12-05 PROBLEM — R42 DIZZINESS: Status: ACTIVE | Noted: 2021-06-21

## 2023-12-05 PROBLEM — G25.0 ESSENTIAL TREMOR: Status: ACTIVE | Noted: 2022-05-01

## 2023-12-05 PROBLEM — K64.4 EXTERNAL HEMORRHOID: Status: ACTIVE | Noted: 2018-05-14

## 2023-12-05 PROBLEM — Z90.89 S/P SUBTOTAL PARATHYROIDECTOMY: Status: ACTIVE | Noted: 2022-07-07

## 2023-12-05 PROBLEM — G47.00 INSOMNIA: Status: ACTIVE | Noted: 2022-05-01

## 2023-12-05 PROBLEM — R91.1 INCIDENTAL PULMONARY NODULE, > 3MM AND < 8MM: Status: ACTIVE | Noted: 2018-09-09

## 2023-12-05 PROBLEM — M79.10 MYALGIA: Status: ACTIVE | Noted: 2018-02-07

## 2023-12-05 PROBLEM — D50.9 IDA (IRON DEFICIENCY ANEMIA): Status: ACTIVE | Noted: 2019-03-07

## 2023-12-05 PROBLEM — F32.9 MAJOR DEPRESSIVE DISORDER: Status: ACTIVE | Noted: 2022-05-05

## 2023-12-05 PROBLEM — H69.92 DISORDER OF LEFT EUSTACHIAN TUBE: Status: ACTIVE | Noted: 2018-07-24

## 2023-12-05 PROBLEM — I82.90 THROMBOSIS: Status: ACTIVE | Noted: 2022-04-30

## 2023-12-05 PROBLEM — C73 PAPILLARY THYROID CARCINOMA (HCC): Status: ACTIVE | Noted: 2018-12-18

## 2023-12-05 PROBLEM — R93.89 ABNORMAL CT OF THE CHEST: Status: ACTIVE | Noted: 2021-10-06

## 2023-12-05 PROBLEM — M51.24 THORACIC DISC HERNIATION: Status: ACTIVE | Noted: 2018-07-24

## 2023-12-05 PROBLEM — R63.4 WEIGHT LOSS: Status: ACTIVE | Noted: 2018-02-07

## 2023-12-05 PROBLEM — R50.9 FEVER, UNSPECIFIED: Status: ACTIVE | Noted: 2018-02-07

## 2023-12-05 PROBLEM — I25.10 CORONARY ARTERIOSCLEROSIS: Status: ACTIVE | Noted: 2022-04-30

## 2023-12-05 PROBLEM — I25.10 ATHEROSCLEROSIS OF CORONARY ARTERY WITHOUT ANGINA PECTORIS: Status: ACTIVE | Noted: 2022-12-19

## 2023-12-05 PROBLEM — N18.30 STAGE 3 CHRONIC KIDNEY DISEASE (HCC): Status: ACTIVE | Noted: 2023-06-14

## 2023-12-05 PROBLEM — I48.91 ATRIAL FIBRILLATION (HCC): Status: ACTIVE | Noted: 2019-03-07

## 2023-12-05 PROBLEM — D12.6 ADENOMATOUS COLON POLYP: Status: ACTIVE | Noted: 2020-10-07

## 2023-12-05 PROBLEM — M79.7 FIBROMYALGIA: Status: ACTIVE | Noted: 2022-04-30

## 2023-12-05 PROBLEM — M62.81 MUSCLE WEAKNESS: Status: ACTIVE | Noted: 2022-10-12

## 2023-12-05 PROBLEM — J43.8 OTHER EMPHYSEMA (HCC): Status: ACTIVE | Noted: 2021-06-21

## 2023-12-05 PROBLEM — F17.210 CONTINUOUS DEPENDENCE ON CIGARETTE SMOKING: Status: ACTIVE | Noted: 2021-06-21

## 2023-12-05 PROBLEM — Z98.890 S/P SUBTOTAL PARATHYROIDECTOMY: Status: ACTIVE | Noted: 2022-07-07

## 2023-12-05 PROBLEM — R73.03 PREDIABETES: Status: ACTIVE | Noted: 2022-06-04

## 2023-12-05 PROBLEM — R53.1 WEAKNESS: Status: ACTIVE | Noted: 2019-02-20

## 2023-12-05 PROBLEM — R10.30 LOWER ABDOMINAL PAIN: Status: ACTIVE | Noted: 2023-02-27

## 2023-12-05 PROBLEM — R00.1 BRADYCARDIA: Status: ACTIVE | Noted: 2022-04-15

## 2023-12-05 PROBLEM — G62.9 POLYNEUROPATHY: Status: ACTIVE | Noted: 2022-05-01

## 2023-12-05 PROBLEM — K21.9 GASTROESOPHAGEAL REFLUX DISEASE: Status: ACTIVE | Noted: 2018-05-14

## 2023-12-05 PROBLEM — R26.2 DIFFICULTY IN WALKING, NOT ELSEWHERE CLASSIFIED: Status: ACTIVE | Noted: 2022-10-22

## 2023-12-05 PROBLEM — C73 MALIGNANT NEOPLASM OF THYROID GLAND (HCC): Status: ACTIVE | Noted: 2022-10-22

## 2023-12-05 PROBLEM — E83.52 HYPERCALCEMIA: Status: ACTIVE | Noted: 2018-07-30

## 2023-12-05 PROBLEM — N90.89 VULVAR LESION: Status: ACTIVE | Noted: 2017-11-15

## 2023-12-05 PROBLEM — D84.9 IMMUNODEFICIENCY DISORDER (HCC): Status: ACTIVE | Noted: 2023-10-16

## 2023-12-05 PROBLEM — E03.9 HYPOTHYROIDISM: Status: ACTIVE | Noted: 2018-03-26

## 2023-12-05 PROBLEM — R11.0 NAUSEA: Status: ACTIVE | Noted: 2018-04-18

## 2023-12-05 PROBLEM — R60.0 BILATERAL EDEMA OF LOWER EXTREMITY: Status: ACTIVE | Noted: 2019-09-12

## 2023-12-05 PROBLEM — I10 ESSENTIAL HYPERTENSION: Status: ACTIVE | Noted: 2018-02-07

## 2023-12-05 PROBLEM — N30.00 ACUTE CYSTITIS WITHOUT HEMATURIA: Status: ACTIVE | Noted: 2018-03-15

## 2023-12-05 PROBLEM — J40 BRONCHITIS: Status: ACTIVE | Noted: 2019-05-22

## 2023-12-05 PROBLEM — F17.209 TOBACCO USE DISORDER, CONTINUOUS: Status: ACTIVE | Noted: 2022-06-21

## 2023-12-05 PROBLEM — J44.9 CHRONIC OBSTRUCTIVE LUNG DISEASE (HCC): Status: ACTIVE | Noted: 2022-05-05

## 2023-12-05 PROBLEM — F41.1 ANXIETY STATE: Status: ACTIVE | Noted: 2018-02-07

## 2023-12-05 PROBLEM — F17.200 NICOTINE DEPENDENCE: Status: ACTIVE | Noted: 2023-05-27

## 2023-12-05 PROBLEM — R30.0 DYSURIA: Status: ACTIVE | Noted: 2018-05-14

## 2023-12-05 PROBLEM — I73.9 PERIPHERAL VASCULAR DISEASE (HCC): Status: ACTIVE | Noted: 2022-05-05

## 2023-12-05 PROBLEM — D89.2 PARAPROTEINEMIA: Status: ACTIVE | Noted: 2019-09-12

## 2023-12-05 PROBLEM — I11.0 HYPERTENSIVE HEART DISEASE WITH CONGESTIVE HEART FAILURE (HCC): Status: ACTIVE | Noted: 2022-05-05

## 2023-12-05 PROCEDURE — G8399 PT W/DXA RESULTS DOCUMENT: HCPCS | Performed by: ORTHOPAEDIC SURGERY

## 2023-12-05 PROCEDURE — G8417 CALC BMI ABV UP PARAM F/U: HCPCS | Performed by: ORTHOPAEDIC SURGERY

## 2023-12-05 PROCEDURE — G8484 FLU IMMUNIZE NO ADMIN: HCPCS | Performed by: ORTHOPAEDIC SURGERY

## 2023-12-05 PROCEDURE — 1090F PRES/ABSN URINE INCON ASSESS: CPT | Performed by: ORTHOPAEDIC SURGERY

## 2023-12-05 PROCEDURE — 1036F TOBACCO NON-USER: CPT | Performed by: ORTHOPAEDIC SURGERY

## 2023-12-05 PROCEDURE — 99204 OFFICE O/P NEW MOD 45 MIN: CPT | Performed by: ORTHOPAEDIC SURGERY

## 2023-12-05 PROCEDURE — 1123F ACP DISCUSS/DSCN MKR DOCD: CPT | Performed by: ORTHOPAEDIC SURGERY

## 2023-12-05 PROCEDURE — G8428 CUR MEDS NOT DOCUMENT: HCPCS | Performed by: ORTHOPAEDIC SURGERY

## 2023-12-05 PROCEDURE — 99406 BEHAV CHNG SMOKING 3-10 MIN: CPT | Performed by: ORTHOPAEDIC SURGERY

## 2023-12-05 RX ORDER — METHYLPREDNISOLONE 4 MG/1
TABLET ORAL
Qty: 1 KIT | Refills: 0 | Status: SHIPPED | OUTPATIENT
Start: 2023-12-05 | End: 2023-12-07 | Stop reason: SDUPTHER

## 2023-12-06 ENCOUNTER — TELEPHONE (OUTPATIENT)
Dept: ORTHOPEDIC SURGERY | Age: 78
End: 2023-12-06

## 2023-12-06 NOTE — TELEPHONE ENCOUNTER
He is stating that he was supposed to get a steroid nelida sent to his pharmacy - the [facility] in [address]. He is checking on the status.

## 2023-12-07 RX ORDER — METHYLPREDNISOLONE 4 MG/1
TABLET ORAL
Qty: 1 KIT | Refills: 0 | Status: SHIPPED | OUTPATIENT
Start: 2023-12-07 | End: 2023-12-13

## 2023-12-07 RX ORDER — METHYLPREDNISOLONE 4 MG/1
TABLET ORAL
Qty: 1 KIT | Refills: 0 | Status: SHIPPED
Start: 2023-12-07 | End: 2023-12-07 | Stop reason: CLARIF

## 2024-02-22 ENCOUNTER — HOSPITAL ENCOUNTER (EMERGENCY)
Age: 79
Discharge: HOME | End: 2024-02-22
Attending: EMERGENCY MEDICINE
Payer: MEDICARE

## 2024-02-22 ENCOUNTER — APPOINTMENT (OUTPATIENT)
Dept: CT IMAGING | Age: 79
End: 2024-02-22
Payer: MEDICARE

## 2024-02-22 VITALS
BODY MASS INDEX: 29.62 KG/M2 | WEIGHT: 200 LBS | RESPIRATION RATE: 20 BRPM | HEART RATE: 75 BPM | DIASTOLIC BLOOD PRESSURE: 92 MMHG | HEIGHT: 69 IN | OXYGEN SATURATION: 98 % | TEMPERATURE: 97.9 F | SYSTOLIC BLOOD PRESSURE: 176 MMHG

## 2024-02-22 DIAGNOSIS — E86.0 DEHYDRATION: ICD-10-CM

## 2024-02-22 DIAGNOSIS — R51.9 SINUS HEADACHE: ICD-10-CM

## 2024-02-22 DIAGNOSIS — R51.9 NONINTRACTABLE EPISODIC HEADACHE, UNSPECIFIED HEADACHE TYPE: Primary | ICD-10-CM

## 2024-02-22 DIAGNOSIS — M79.601 PAIN OF RIGHT UPPER EXTREMITY: ICD-10-CM

## 2024-02-22 LAB
ALBUMIN SERPL-MCNC: 3.3 G/DL (ref 3.2–4.6)
ALBUMIN/GLOB SERPL: 0.6 (ref 0.4–1.6)
ALP SERPL-CCNC: 94 U/L (ref 50–136)
ALT SERPL-CCNC: 16 U/L (ref 12–65)
ANION GAP SERPL CALC-SCNC: 6 MMOL/L (ref 2–11)
APPEARANCE UR: CLEAR
AST SERPL-CCNC: 48 U/L (ref 15–37)
BASOPHILS # BLD: 0 K/UL (ref 0–0.2)
BASOPHILS NFR BLD: 1 % (ref 0–2)
BILIRUB SERPL-MCNC: 0.3 MG/DL (ref 0.2–1.1)
BILIRUB UR QL: NEGATIVE
BUN SERPL-MCNC: 15 MG/DL (ref 8–23)
CALCIUM SERPL-MCNC: 9.1 MG/DL (ref 8.3–10.4)
CHLORIDE SERPL-SCNC: 106 MMOL/L (ref 103–113)
CO2 SERPL-SCNC: 26 MMOL/L (ref 21–32)
COLOR UR: NORMAL
CREAT SERPL-MCNC: 1.41 MG/DL (ref 0.6–1)
DIFFERENTIAL METHOD BLD: ABNORMAL
EOSINOPHIL # BLD: 0.1 K/UL (ref 0–0.8)
EOSINOPHIL NFR BLD: 1 % (ref 0.5–7.8)
ERYTHROCYTE [DISTWIDTH] IN BLOOD BY AUTOMATED COUNT: 16.5 % (ref 11.9–14.6)
GLOBULIN SER CALC-MCNC: 5.9 G/DL (ref 2.8–4.5)
GLUCOSE SERPL-MCNC: 102 MG/DL (ref 65–100)
GLUCOSE UR STRIP.AUTO-MCNC: NEGATIVE MG/DL
HCT VFR BLD AUTO: 40.5 % (ref 35.8–46.3)
HGB BLD-MCNC: 12.8 G/DL (ref 11.7–15.4)
HGB UR QL STRIP: NEGATIVE
IMM GRANULOCYTES # BLD AUTO: 0 K/UL (ref 0–0.5)
IMM GRANULOCYTES NFR BLD AUTO: 0 % (ref 0–5)
INR PPP: 1
KETONES UR QL STRIP.AUTO: NEGATIVE MG/DL
LEUKOCYTE ESTERASE UR QL STRIP.AUTO: NEGATIVE
LYMPHOCYTES # BLD: 1.7 K/UL (ref 0.5–4.6)
LYMPHOCYTES NFR BLD: 36 % (ref 13–44)
MAGNESIUM SERPL-MCNC: 2.4 MG/DL (ref 1.8–2.4)
MCH RBC QN AUTO: 28.5 PG (ref 26.1–32.9)
MCHC RBC AUTO-ENTMCNC: 31.6 G/DL (ref 31.4–35)
MCV RBC AUTO: 90.2 FL (ref 82–102)
MONOCYTES # BLD: 0.4 K/UL (ref 0.1–1.3)
MONOCYTES NFR BLD: 8 % (ref 4–12)
NEUTS SEG # BLD: 2.7 K/UL (ref 1.7–8.2)
NEUTS SEG NFR BLD: 55 % (ref 43–78)
NITRITE UR QL STRIP.AUTO: NEGATIVE
NRBC # BLD: 0 K/UL (ref 0–0.2)
PH UR STRIP: 7.5 (ref 5–9)
PLATELET # BLD AUTO: 201 K/UL (ref 150–450)
PMV BLD AUTO: 10 FL (ref 9.4–12.3)
POTASSIUM SERPL-SCNC: 3.7 MMOL/L (ref 3.5–5.1)
PROT SERPL-MCNC: 9.2 G/DL (ref 6.3–8.2)
PROT UR STRIP-MCNC: NEGATIVE MG/DL
PROTHROMBIN TIME: 13.1 SEC (ref 11.3–14.9)
RBC # BLD AUTO: 4.49 M/UL (ref 4.05–5.2)
SODIUM SERPL-SCNC: 138 MMOL/L (ref 136–146)
SP GR UR REFRACTOMETRY: 1.01 (ref 1–1.02)
UROBILINOGEN UR QL STRIP.AUTO: 0.2 EU/DL (ref 0.2–1)
WBC # BLD AUTO: 4.8 K/UL (ref 4.3–11.1)

## 2024-02-22 PROCEDURE — 83735 ASSAY OF MAGNESIUM: CPT

## 2024-02-22 PROCEDURE — 96374 THER/PROPH/DIAG INJ IV PUSH: CPT

## 2024-02-22 PROCEDURE — 96361 HYDRATE IV INFUSION ADD-ON: CPT

## 2024-02-22 PROCEDURE — 96375 TX/PRO/DX INJ NEW DRUG ADDON: CPT

## 2024-02-22 PROCEDURE — 93005 ELECTROCARDIOGRAM TRACING: CPT | Performed by: EMERGENCY MEDICINE

## 2024-02-22 PROCEDURE — 80053 COMPREHEN METABOLIC PANEL: CPT

## 2024-02-22 PROCEDURE — 85610 PROTHROMBIN TIME: CPT

## 2024-02-22 PROCEDURE — 6370000000 HC RX 637 (ALT 250 FOR IP): Performed by: EMERGENCY MEDICINE

## 2024-02-22 PROCEDURE — 2580000003 HC RX 258: Performed by: EMERGENCY MEDICINE

## 2024-02-22 PROCEDURE — 99284 EMERGENCY DEPT VISIT MOD MDM: CPT

## 2024-02-22 PROCEDURE — 6360000002 HC RX W HCPCS: Performed by: EMERGENCY MEDICINE

## 2024-02-22 PROCEDURE — 81003 URINALYSIS AUTO W/O SCOPE: CPT

## 2024-02-22 PROCEDURE — 85025 COMPLETE CBC W/AUTO DIFF WBC: CPT

## 2024-02-22 PROCEDURE — 70450 CT HEAD/BRAIN W/O DYE: CPT

## 2024-02-22 RX ORDER — 0.9 % SODIUM CHLORIDE 0.9 %
1000 INTRAVENOUS SOLUTION INTRAVENOUS
Status: COMPLETED | OUTPATIENT
Start: 2024-02-22 | End: 2024-02-22

## 2024-02-22 RX ORDER — PROCHLORPERAZINE EDISYLATE 5 MG/ML
10 INJECTION INTRAMUSCULAR; INTRAVENOUS
Status: COMPLETED | OUTPATIENT
Start: 2024-02-22 | End: 2024-02-22

## 2024-02-22 RX ORDER — DIPHENHYDRAMINE HYDROCHLORIDE 50 MG/ML
12.5 INJECTION INTRAMUSCULAR; INTRAVENOUS
Status: COMPLETED | OUTPATIENT
Start: 2024-02-22 | End: 2024-02-22

## 2024-02-22 RX ORDER — DIAZEPAM 5 MG/1
5 TABLET ORAL ONCE
Status: COMPLETED | OUTPATIENT
Start: 2024-02-22 | End: 2024-02-22

## 2024-02-22 RX ORDER — AMOXICILLIN AND CLAVULANATE POTASSIUM 875; 125 MG/1; MG/1
1 TABLET, FILM COATED ORAL 2 TIMES DAILY
Qty: 14 TABLET | Refills: 0 | Status: SHIPPED | OUTPATIENT
Start: 2024-02-22 | End: 2024-02-29

## 2024-02-22 RX ORDER — KETOROLAC TROMETHAMINE 15 MG/ML
15 INJECTION, SOLUTION INTRAMUSCULAR; INTRAVENOUS
Status: COMPLETED | OUTPATIENT
Start: 2024-02-22 | End: 2024-02-22

## 2024-02-22 RX ADMIN — DIPHENHYDRAMINE HYDROCHLORIDE 12.5 MG: 50 INJECTION INTRAMUSCULAR; INTRAVENOUS at 17:05

## 2024-02-22 RX ADMIN — KETOROLAC TROMETHAMINE 15 MG: 15 INJECTION, SOLUTION INTRAMUSCULAR; INTRAVENOUS at 17:08

## 2024-02-22 RX ADMIN — SODIUM CHLORIDE 1000 ML: 9 INJECTION, SOLUTION INTRAVENOUS at 17:26

## 2024-02-22 RX ADMIN — DIAZEPAM 5 MG: 5 TABLET ORAL at 18:14

## 2024-02-22 RX ADMIN — PROCHLORPERAZINE EDISYLATE 10 MG: 5 INJECTION INTRAMUSCULAR; INTRAVENOUS at 17:11

## 2024-02-22 ASSESSMENT — ENCOUNTER SYMPTOMS
BACK PAIN: 0
COLOR CHANGE: 0
CHOKING: 0
NAUSEA: 0
VOMITING: 0
ABDOMINAL PAIN: 0
SORE THROAT: 0
EYE PAIN: 0
CHEST TIGHTNESS: 0
SHORTNESS OF BREATH: 0
PHOTOPHOBIA: 0

## 2024-02-22 ASSESSMENT — LIFESTYLE VARIABLES
HOW MANY STANDARD DRINKS CONTAINING ALCOHOL DO YOU HAVE ON A TYPICAL DAY: PATIENT DOES NOT DRINK
HOW OFTEN DO YOU HAVE A DRINK CONTAINING ALCOHOL: NEVER

## 2024-02-22 ASSESSMENT — PAIN SCALES - GENERAL
PAINLEVEL_OUTOF10: 10
PAINLEVEL_OUTOF10: 10

## 2024-02-22 ASSESSMENT — PAIN DESCRIPTION - LOCATION
LOCATION: HEAD
LOCATION: CHEST;HEAD

## 2024-02-22 ASSESSMENT — PAIN DESCRIPTION - DESCRIPTORS
DESCRIPTORS: HEAVINESS
DESCRIPTORS: HEAVINESS

## 2024-02-22 ASSESSMENT — PAIN DESCRIPTION - ORIENTATION: ORIENTATION: MID

## 2024-02-22 NOTE — ED TRIAGE NOTES
Patient states that she has a head pain that started around 12 today. Feels like something is sitting on her head \"a ton sitting on her head\". It hurts the most on the left temple. Her head is heavy     Her right arms is having bad pain in her right arm.

## 2024-02-22 NOTE — ED PROVIDER NOTES
for the past couple days she has had pressure which she feels is most likely the left side of her head.  She states that the is occasionally associated with some pain.  States she at times feels that \"her head is heavy\".  She denies any trauma.  Denies any fevers or chills.  Has history of rheumatoid arthritis.  Denies any new neck pain or neck stiffness.  States she does occasionally get a \"shooting pain\" down her right arm.  Denies any fevers or vomiting.    The history is provided by the patient and a relative.   Headache  Pain location:  Occipital  Quality:  Dull  Radiates to:  R arm  Severity currently:  3/10  Severity at highest:  7/10  Onset quality:  Gradual  Duration:  5 days  Timing:  Intermittent  Progression:  Partially resolved  Chronicity:  New  Context: not activity, not exposure to bright light, not defecating and not eating    Relieved by:  Nothing  Worsened by:  Nothing  Associated symptoms: fatigue and weakness    Associated symptoms: no abdominal pain, no back pain, no congestion, no eye pain, no facial pain, no nausea, no near-syncope, no photophobia, no sore throat and no vomiting        ROS     Review of Systems   Constitutional:  Positive for fatigue.   HENT:  Negative for congestion, dental problem and sore throat.    Eyes:  Negative for photophobia, pain and visual disturbance.   Respiratory:  Negative for choking, chest tightness and shortness of breath.    Cardiovascular:  Negative for chest pain, palpitations and near-syncope.   Gastrointestinal:  Negative for abdominal pain, nausea and vomiting.   Endocrine: Negative for polydipsia and polyuria.   Musculoskeletal:  Negative for back pain and gait problem.   Skin:  Negative for color change, pallor and rash.   Neurological:  Positive for weakness and headaches. Negative for tremors.   Hematological:  Negative for adenopathy.   Psychiatric/Behavioral:  Negative for behavioral problems and confusion.    All other systems reviewed and are

## 2024-02-22 NOTE — ED NOTES
Head feels \"heavy\" and it radiates down through neck. Pt complains of dizziness, pt denies any pain in head. States she \"just doesn't feel right.\" States this has been off and on for about a week.   Arik any Fever or chills, denies any N/V.   Started taking levothyroxine again within the past two weeks, pt is unsure if this is related to starting back on that medicine.

## 2024-02-23 LAB
EKG ATRIAL RATE: 74 BPM
EKG DIAGNOSIS: NORMAL
EKG P AXIS: 59 DEGREES
EKG P-R INTERVAL: 315 MS
EKG Q-T INTERVAL: 424 MS
EKG QRS DURATION: 124 MS
EKG QTC CALCULATION (BAZETT): 468 MS
EKG R AXIS: -29 DEGREES
EKG T AXIS: 38 DEGREES
EKG VENTRICULAR RATE: 73 BPM

## 2024-02-23 PROCEDURE — 93010 ELECTROCARDIOGRAM REPORT: CPT | Performed by: INTERNAL MEDICINE

## 2024-02-23 NOTE — DISCHARGE INSTRUCTIONS
As we discussed, your testing done today shows no signs of any serious or life-threatening illnesses  You may have some degree of dehydration   However given your rheumatoid your pain could be related to arthritis at multiple joints  We are prescribing antibiotics for possible sinusitis  Follow-up with your primary care physician  Return to the ER for any new, worsening or life-threatening symptom

## 2024-10-25 NOTE — ED TRIAGE NOTES
- Follow up with your PCP or specialty clinic as directed in the next 1-2 weeks if not improved or as needed.  You can call [phone] to schedule an appointment with the appropriate provider.    - Go to the ER or seek medical attention immediately if you develop new or worsening symptoms.     - You must understand that you have received an Urgent Care treatment only and that you may be released before all of your medical problems are known or treated.   - You, the patient, will arrange for follow up care as instructed.   - If your condition worsens or fails to improve we recommend that you receive another evaluation at the ER immediately or contact your PCP to discuss your concerns or return here.     Pt states that she's been having left lower leg/foot pain since 11/12. Pt states her nurse was concerned about a blood clot. Pt denies hx of blood clots. Pt also denies shortness of breath or chest pain.

## 2025-05-19 ENCOUNTER — HOSPITAL ENCOUNTER (EMERGENCY)
Age: 80
Discharge: HOME | End: 2025-05-19
Attending: EMERGENCY MEDICINE
Payer: MEDICARE

## 2025-05-19 ENCOUNTER — APPOINTMENT (OUTPATIENT)
Dept: CT IMAGING | Age: 80
End: 2025-05-19
Payer: MEDICARE

## 2025-05-19 ENCOUNTER — APPOINTMENT (OUTPATIENT)
Dept: GENERAL RADIOLOGY | Age: 80
End: 2025-05-19
Payer: MEDICARE

## 2025-05-19 VITALS
WEIGHT: 200 LBS | HEIGHT: 69 IN | RESPIRATION RATE: 15 BRPM | TEMPERATURE: 98.9 F | OXYGEN SATURATION: 100 % | BODY MASS INDEX: 29.62 KG/M2 | HEART RATE: 68 BPM | DIASTOLIC BLOOD PRESSURE: 95 MMHG | SYSTOLIC BLOOD PRESSURE: 168 MMHG

## 2025-05-19 DIAGNOSIS — R07.9 CHEST PAIN, UNSPECIFIED TYPE: Primary | ICD-10-CM

## 2025-05-19 LAB
ALBUMIN SERPL-MCNC: 3.3 G/DL (ref 3.2–4.6)
ALBUMIN/GLOB SERPL: 0.8 (ref 1–1.9)
ALP SERPL-CCNC: 86 U/L (ref 35–104)
ALT SERPL-CCNC: 13 U/L (ref 8–45)
ANION GAP SERPL CALC-SCNC: 13 MMOL/L (ref 7–16)
AST SERPL-CCNC: 45 U/L (ref 15–37)
BASOPHILS # BLD: 0.02 K/UL (ref 0–0.2)
BASOPHILS NFR BLD: 0.4 % (ref 0–2)
BILIRUB SERPL-MCNC: 0.3 MG/DL (ref 0–1.2)
BUN SERPL-MCNC: 22 MG/DL (ref 8–23)
CALCIUM SERPL-MCNC: 8.7 MG/DL (ref 8.8–10.2)
CHLORIDE SERPL-SCNC: 106 MMOL/L (ref 98–107)
CO2 SERPL-SCNC: 23 MMOL/L (ref 20–29)
CREAT SERPL-MCNC: 1.46 MG/DL (ref 0.6–1.1)
DIFFERENTIAL METHOD BLD: ABNORMAL
EKG ATRIAL RATE: 0 BPM
EKG DIAGNOSIS: NORMAL
EKG Q-T INTERVAL: 431 MS
EKG QRS DURATION: 100 MS
EKG QTC CALCULATION (BAZETT): 449 MS
EKG R AXIS: -32 DEGREES
EKG T AXIS: 8 DEGREES
EKG VENTRICULAR RATE: 65 BPM
EOSINOPHIL # BLD: 0.1 K/UL (ref 0–0.8)
EOSINOPHIL NFR BLD: 2 % (ref 0.5–7.8)
ERYTHROCYTE [DISTWIDTH] IN BLOOD BY AUTOMATED COUNT: 14.6 % (ref 11.9–14.6)
GLOBULIN SER CALC-MCNC: 4.1 G/DL (ref 2.3–3.5)
GLUCOSE SERPL-MCNC: 88 MG/DL (ref 70–99)
HCT VFR BLD AUTO: 37.8 % (ref 35.8–46.3)
HGB BLD-MCNC: 11.9 G/DL (ref 11.7–15.4)
IMM GRANULOCYTES # BLD AUTO: 0.02 K/UL (ref 0–0.5)
IMM GRANULOCYTES NFR BLD AUTO: 0.4 % (ref 0–5)
LYMPHOCYTES # BLD: 1.55 K/UL (ref 0.5–4.6)
LYMPHOCYTES NFR BLD: 30.8 % (ref 13–44)
MCH RBC QN AUTO: 29.6 PG (ref 26.1–32.9)
MCHC RBC AUTO-ENTMCNC: 31.5 G/DL (ref 31.4–35)
MCV RBC AUTO: 94 FL (ref 82–102)
MONOCYTES # BLD: 0.59 K/UL (ref 0.1–1.3)
MONOCYTES NFR BLD: 11.7 % (ref 4–12)
NEUTS SEG # BLD: 2.75 K/UL (ref 1.7–8.2)
NEUTS SEG NFR BLD: 54.7 % (ref 43–78)
NRBC # BLD: 0 K/UL (ref 0–0.2)
PLATELET # BLD AUTO: 158 K/UL (ref 150–450)
PMV BLD AUTO: 10.7 FL (ref 9.4–12.3)
POTASSIUM SERPL-SCNC: 4.2 MMOL/L (ref 3.5–5.1)
PROT SERPL-MCNC: 7.4 G/DL (ref 6.3–8.2)
RBC # BLD AUTO: 4.02 M/UL (ref 4.05–5.2)
SODIUM SERPL-SCNC: 142 MMOL/L (ref 136–145)
TROPONIN T SERPL HS-MCNC: 44.4 NG/L (ref 0–14)
TROPONIN T SERPL HS-MCNC: 49.9 NG/L (ref 0–14)
WBC # BLD AUTO: 5 K/UL (ref 4.3–11.1)

## 2025-05-19 PROCEDURE — 6360000002 HC RX W HCPCS: Performed by: EMERGENCY MEDICINE

## 2025-05-19 PROCEDURE — 80053 COMPREHEN METABOLIC PANEL: CPT

## 2025-05-19 PROCEDURE — 96374 THER/PROPH/DIAG INJ IV PUSH: CPT

## 2025-05-19 PROCEDURE — 99285 EMERGENCY DEPT VISIT HI MDM: CPT

## 2025-05-19 PROCEDURE — 6360000004 HC RX CONTRAST MEDICATION: Performed by: EMERGENCY MEDICINE

## 2025-05-19 PROCEDURE — 2580000003 HC RX 258: Performed by: EMERGENCY MEDICINE

## 2025-05-19 PROCEDURE — 85025 COMPLETE CBC W/AUTO DIFF WBC: CPT

## 2025-05-19 PROCEDURE — 6370000000 HC RX 637 (ALT 250 FOR IP): Performed by: EMERGENCY MEDICINE

## 2025-05-19 PROCEDURE — 96375 TX/PRO/DX INJ NEW DRUG ADDON: CPT

## 2025-05-19 PROCEDURE — 93010 ELECTROCARDIOGRAM REPORT: CPT | Performed by: INTERNAL MEDICINE

## 2025-05-19 PROCEDURE — 93005 ELECTROCARDIOGRAM TRACING: CPT | Performed by: EMERGENCY MEDICINE

## 2025-05-19 PROCEDURE — 84484 ASSAY OF TROPONIN QUANT: CPT

## 2025-05-19 PROCEDURE — 71260 CT THORAX DX C+: CPT

## 2025-05-19 PROCEDURE — 71046 X-RAY EXAM CHEST 2 VIEWS: CPT

## 2025-05-19 RX ORDER — PREDNISONE 50 MG/1
50 TABLET ORAL
Status: COMPLETED | OUTPATIENT
Start: 2025-05-19 | End: 2025-05-19

## 2025-05-19 RX ORDER — PREDNISONE 10 MG/1
TABLET ORAL
Qty: 18 TABLET | Refills: 0 | Status: SHIPPED
Start: 2025-05-19 | End: 2025-05-19

## 2025-05-19 RX ORDER — PREDNISONE 10 MG/1
TABLET ORAL
Qty: 18 TABLET | Refills: 0 | Status: SHIPPED | OUTPATIENT
Start: 2025-05-19 | End: 2025-05-28

## 2025-05-19 RX ORDER — IOPAMIDOL 755 MG/ML
100 INJECTION, SOLUTION INTRAVASCULAR
Status: COMPLETED | OUTPATIENT
Start: 2025-05-19 | End: 2025-05-19

## 2025-05-19 RX ORDER — KETOROLAC TROMETHAMINE 15 MG/ML
15 INJECTION, SOLUTION INTRAMUSCULAR; INTRAVENOUS
Status: COMPLETED | OUTPATIENT
Start: 2025-05-19 | End: 2025-05-19

## 2025-05-19 RX ORDER — LIDOCAINE HYDROCHLORIDE 20 MG/ML
15 SOLUTION OROPHARYNGEAL
Status: COMPLETED | OUTPATIENT
Start: 2025-05-19 | End: 2025-05-19

## 2025-05-19 RX ORDER — SODIUM CHLORIDE, SODIUM LACTATE, POTASSIUM CHLORIDE, AND CALCIUM CHLORIDE .6; .31; .03; .02 G/100ML; G/100ML; G/100ML; G/100ML
1000 INJECTION, SOLUTION INTRAVENOUS ONCE
Status: COMPLETED | OUTPATIENT
Start: 2025-05-19 | End: 2025-05-19

## 2025-05-19 RX ADMIN — LIDOCAINE HYDROCHLORIDE 15 ML: 20 SOLUTION ORAL at 17:23

## 2025-05-19 RX ADMIN — KETOROLAC TROMETHAMINE 15 MG: 15 INJECTION, SOLUTION INTRAMUSCULAR; INTRAVENOUS at 18:24

## 2025-05-19 RX ADMIN — SODIUM CHLORIDE, SODIUM LACTATE, POTASSIUM CHLORIDE, AND CALCIUM CHLORIDE 1000 ML: .6; .31; .03; .02 INJECTION, SOLUTION INTRAVENOUS at 17:22

## 2025-05-19 RX ADMIN — FAMOTIDINE 20 MG: 10 INJECTION, SOLUTION INTRAVENOUS at 17:23

## 2025-05-19 RX ADMIN — PREDNISONE 50 MG: 50 TABLET ORAL at 18:24

## 2025-05-19 RX ADMIN — IOPAMIDOL 100 ML: 755 INJECTION, SOLUTION INTRAVENOUS at 18:48

## 2025-05-19 ASSESSMENT — LIFESTYLE VARIABLES
HOW OFTEN DO YOU HAVE A DRINK CONTAINING ALCOHOL: NEVER
HOW MANY STANDARD DRINKS CONTAINING ALCOHOL DO YOU HAVE ON A TYPICAL DAY: PATIENT DOES NOT DRINK

## 2025-05-19 ASSESSMENT — PAIN DESCRIPTION - PAIN TYPE: TYPE: ACUTE PAIN

## 2025-05-19 ASSESSMENT — PAIN - FUNCTIONAL ASSESSMENT: PAIN_FUNCTIONAL_ASSESSMENT: 0-10

## 2025-05-19 ASSESSMENT — PAIN SCALES - GENERAL: PAINLEVEL_OUTOF10: 4

## 2025-05-19 NOTE — ED PROVIDER NOTES
Although efforts were made to correct errors, please be aware of potential sound-alike names and dictations errors.       Georgette Tanner MD  05/21/25 1043

## 2025-05-19 NOTE — ED TRIAGE NOTES
Pt arrives via [facility] coming from home c/o CP that started 1pm. 1 nitro w/ EMS. 324 ASA PTA. Pt had BP of 200/110 on EMS arrival

## (undated) DEVICE — (D)PREP SKN CHLRAPRP APPL 26ML -- CONVERT TO ITEM 371833

## (undated) DEVICE — SOLUTION IV 1000ML 0.9% SOD CHL

## (undated) DEVICE — NEEDLE SPNL 22GA L3.5IN BLK HUB S STL REG WALL FIT STYL W/

## (undated) DEVICE — BNDG COHESIVE 4INX5YD COBAN --

## (undated) DEVICE — SUTURE VCRL SZ 2-0 L27IN ABSRB UD L36MM CP-1 1/2 CIR REV J266H

## (undated) DEVICE — REM POLYHESIVE ADULT PATIENT RETURN ELECTRODE: Brand: VALLEYLAB

## (undated) DEVICE — SUTURE STRATAFIX SYMMETRIC PDS + SZ 1 L18IN ABSRB VLT L48MM SXPP1A400

## (undated) DEVICE — TRAY PREP DRY W/ PREM GLV 2 APPL 6 SPNG 2 UNDPD 1 OVERWRAP

## (undated) DEVICE — SOLUTION IV 250ML 0.9% SOD CHL CLR INJ FLX BG CONT PRT CLSR

## (undated) DEVICE — Z DISCONTINUED USE 2744636  DRESSING AQUACEL 14 IN ALG W3.5XL14IN POLYUR FLM CVR W/ HYDRCOLL

## (undated) DEVICE — BIPOLAR SEALER 23-112-1 AQM 6.0: Brand: AQUAMANTYS ®

## (undated) DEVICE — SYR LR LCK 1ML GRAD NSAF 30ML --

## (undated) DEVICE — HANDPIECE SET WITH COAXIAL HIGH FLOW TIP AND SUCTION TUBE: Brand: INTERPULSE

## (undated) DEVICE — SYRINGE CATH TIP 50ML

## (undated) DEVICE — DRAPE TWL SURG 16X26IN BLU ORB04] ALLCARE INC]

## (undated) DEVICE — TOTAL HIP DR RIDGEWAY: Brand: MEDLINE INDUSTRIES, INC.

## (undated) DEVICE — STRYKER PERFORMANCE SERIES SAGITTAL BLADE: Brand: STRYKER PERFORMANCE SERIES

## (undated) DEVICE — CLOSURE SKIN FACILITATES COMPATIBILITY W/ CERTAIN IS DSG

## (undated) DEVICE — SOLUTION IRRIG 3000ML 0.9% SOD CHL FLX CONT 0797208] ICU MEDICAL INC]

## (undated) DEVICE — SUTURE STRATAFIX SPRL SZ 1 L14IN ABSRB VLT L48CM CTX 1/2 SXPD2B405

## (undated) DEVICE — SUTURE PDS II SZ 1 L96IN ABSRB VLT TP-1 L65MM 1/2 CIR Z880G

## (undated) DEVICE — HEWSON SUTURE RETRIEVER: Brand: HEWSON SUTURE RETRIEVER

## (undated) DEVICE — 3M™ IOBAN™ 2 ANTIMICROBIAL INCISE DRAPE 6651EZ: Brand: IOBAN™ 2

## (undated) DEVICE — SUTURE ETHBND EXCEL SZ 5 L30IN NONABSORBABLE GRN L40MM V-37 MB66G

## (undated) DEVICE — NEEDLE,18GX1.5",REG,BEVEL: Brand: MEDLINE

## (undated) DEVICE — 3M™ STERI-DRAPE™ INSTRUMENT POUCH 1018: Brand: STERI-DRAPE™

## (undated) DEVICE — SYR 50ML LR LCK 1ML GRAD NSAF --

## (undated) DEVICE — T4 HOOD

## (undated) DEVICE — ZIP 16 SURGICAL SKIN CLOSURE DEVICE: Brand: ZIP 16 SURGICAL SKIN CLOSURE DEVICE

## (undated) DEVICE — SUTURE VCRL SZ 1 L36IN ABSRB UD CTX L48MM 1/2 CIR J977H

## (undated) DEVICE — YANKAUER,BULB TIP,W/O VENT,RIGID,STERILE: Brand: MEDLINE